# Patient Record
Sex: FEMALE | Race: WHITE | NOT HISPANIC OR LATINO
[De-identification: names, ages, dates, MRNs, and addresses within clinical notes are randomized per-mention and may not be internally consistent; named-entity substitution may affect disease eponyms.]

---

## 2017-01-13 ENCOUNTER — APPOINTMENT (OUTPATIENT)
Age: 67
End: 2017-01-13

## 2017-01-13 VITALS
HEIGHT: 61 IN | DIASTOLIC BLOOD PRESSURE: 87 MMHG | WEIGHT: 143 LBS | HEART RATE: 81 BPM | SYSTOLIC BLOOD PRESSURE: 156 MMHG | OXYGEN SATURATION: 98 % | BODY MASS INDEX: 27 KG/M2

## 2017-01-27 ENCOUNTER — OUTPATIENT (OUTPATIENT)
Dept: OUTPATIENT SERVICES | Facility: HOSPITAL | Age: 67
LOS: 1 days | Discharge: HOME | End: 2017-01-27
Payer: MEDICARE

## 2017-01-27 DIAGNOSIS — M54.17 RADICULOPATHY, LUMBOSACRAL REGION: ICD-10-CM

## 2017-02-24 ENCOUNTER — TRANSCRIPTION ENCOUNTER (OUTPATIENT)
Age: 67
End: 2017-02-24

## 2017-02-24 ENCOUNTER — OUTPATIENT (OUTPATIENT)
Dept: OUTPATIENT SERVICES | Facility: HOSPITAL | Age: 67
LOS: 1 days | End: 2017-02-24
Payer: MEDICARE

## 2017-02-24 DIAGNOSIS — M54.16 RADICULOPATHY, LUMBAR REGION: ICD-10-CM

## 2017-02-24 PROCEDURE — 76000 FLUOROSCOPY <1 HR PHYS/QHP: CPT

## 2017-02-24 PROCEDURE — 64493 INJ PARAVERT F JNT L/S 1 LEV: CPT | Mod: 50

## 2017-02-24 PROCEDURE — 64494 INJ PARAVERT F JNT L/S 2 LEV: CPT | Mod: 50

## 2017-03-07 ENCOUNTER — TRANSCRIPTION ENCOUNTER (OUTPATIENT)
Age: 67
End: 2017-03-07

## 2017-03-10 ENCOUNTER — APPOINTMENT (OUTPATIENT)
Age: 67
End: 2017-03-10

## 2017-04-13 PROCEDURE — 64483 NJX AA&/STRD TFRM EPI L/S 1: CPT | Mod: LT

## 2017-04-13 PROCEDURE — 76000 FLUOROSCOPY <1 HR PHYS/QHP: CPT

## 2017-04-13 PROCEDURE — 64484 NJX AA&/STRD TFRM EPI L/S EA: CPT | Mod: LT

## 2017-07-14 ENCOUNTER — APPOINTMENT (OUTPATIENT)
Age: 67
End: 2017-07-14

## 2017-07-14 VITALS
HEART RATE: 80 BPM | BODY MASS INDEX: 26.43 KG/M2 | OXYGEN SATURATION: 94 % | SYSTOLIC BLOOD PRESSURE: 145 MMHG | DIASTOLIC BLOOD PRESSURE: 76 MMHG | HEIGHT: 61 IN | WEIGHT: 140 LBS

## 2017-07-14 DIAGNOSIS — Z87.39 PERSONAL HISTORY OF OTHER DISEASES OF THE MUSCULOSKELETAL SYSTEM AND CONNECTIVE TISSUE: ICD-10-CM

## 2017-07-14 RX ORDER — LIDOCAINE HCL AND HYDROCORTISONE ACETATE 20; 20 MG/G; MG/G
CREAM RECTAL
Refills: 0 | Status: DISCONTINUED | COMMUNITY
End: 2017-07-14

## 2018-01-16 ENCOUNTER — OUTPATIENT (OUTPATIENT)
Dept: OUTPATIENT SERVICES | Facility: HOSPITAL | Age: 68
LOS: 1 days | End: 2018-01-16
Payer: MEDICARE

## 2018-01-16 ENCOUNTER — TRANSCRIPTION ENCOUNTER (OUTPATIENT)
Age: 68
End: 2018-01-16

## 2018-01-16 DIAGNOSIS — M46.1 SACROILIITIS, NOT ELSEWHERE CLASSIFIED: ICD-10-CM

## 2018-01-16 PROCEDURE — G0260: CPT | Mod: 50

## 2018-01-16 PROCEDURE — 76000 FLUOROSCOPY <1 HR PHYS/QHP: CPT

## 2018-03-09 ENCOUNTER — APPOINTMENT (OUTPATIENT)
Age: 68
End: 2018-03-09
Payer: MEDICARE

## 2018-03-09 VITALS
SYSTOLIC BLOOD PRESSURE: 130 MMHG | BODY MASS INDEX: 26.06 KG/M2 | RESPIRATION RATE: 14 BRPM | DIASTOLIC BLOOD PRESSURE: 86 MMHG | TEMPERATURE: 98.1 F | HEART RATE: 88 BPM | HEIGHT: 61 IN | OXYGEN SATURATION: 87 % | WEIGHT: 138 LBS

## 2018-03-09 PROCEDURE — 99214 OFFICE O/P EST MOD 30 MIN: CPT

## 2018-03-09 RX ORDER — LIFITEGRAST 50 MG/ML
5 SOLUTION/ DROPS OPHTHALMIC
Refills: 0 | Status: DISCONTINUED | COMMUNITY
End: 2018-03-09

## 2018-03-09 RX ORDER — ACETAMINOPHEN 325 MG
5000 TABLET ORAL
Refills: 0 | Status: DISCONTINUED | COMMUNITY
End: 2018-03-09

## 2018-08-10 ENCOUNTER — APPOINTMENT (OUTPATIENT)
Dept: HEPATOLOGY | Facility: CLINIC | Age: 68
End: 2018-08-10
Payer: MEDICARE

## 2018-08-10 VITALS
DIASTOLIC BLOOD PRESSURE: 76 MMHG | HEIGHT: 60.5 IN | OXYGEN SATURATION: 97 % | BODY MASS INDEX: 25.82 KG/M2 | TEMPERATURE: 98.6 F | HEART RATE: 75 BPM | WEIGHT: 135 LBS | SYSTOLIC BLOOD PRESSURE: 127 MMHG

## 2018-08-10 DIAGNOSIS — Z86.39 PERSONAL HISTORY OF OTHER ENDOCRINE, NUTRITIONAL AND METABOLIC DISEASE: ICD-10-CM

## 2018-08-10 PROCEDURE — 99214 OFFICE O/P EST MOD 30 MIN: CPT

## 2019-01-25 ENCOUNTER — APPOINTMENT (OUTPATIENT)
Dept: HEPATOLOGY | Facility: CLINIC | Age: 69
End: 2019-01-25
Payer: MEDICARE

## 2019-01-25 VITALS
SYSTOLIC BLOOD PRESSURE: 161 MMHG | OXYGEN SATURATION: 95 % | WEIGHT: 128 LBS | DIASTOLIC BLOOD PRESSURE: 93 MMHG | HEIGHT: 60.5 IN | HEART RATE: 87 BPM | BODY MASS INDEX: 24.48 KG/M2

## 2019-01-25 PROCEDURE — 99214 OFFICE O/P EST MOD 30 MIN: CPT

## 2019-01-25 NOTE — REVIEW OF SYSTEMS
[Recent Weight Loss (___ Lbs)] : recent [unfilled] ~Ulb weight loss [Dry Eyes] : dryness of the eyes [Heartburn] : heartburn [Negative] : Heme/Lymph [Fever] : no fever [Chills] : no chills [Feeling Poorly] : not feeling poorly [Feeling Tired] : not feeling tired [Recent Weight Gain (___ Lbs)] : no recent weight gain [FreeTextEntry9] : back pain

## 2019-01-25 NOTE — ASSESSMENT
[FreeTextEntry1] : This patient with PBC has normal alkaline phosphatase and is well-controlled on ursodiol which she will continue.  The patient does have dryness of her eyes and has had Lasik surgery and will follow up with her ophthalmologist to assure health of her corneas, the patient has had bone density.  Assessment showing osteopenia and participates in weight-bearing exercise, and reports vitamin D levels have been normal.  Hyperlipidemia is well-controlled on statin therapy, which shows no evidence of hepatotoxicity.  The patient will continue ursodiol with a return visit in 6 months.

## 2019-01-25 NOTE — HISTORY OF PRESENT ILLNESS
[Autoimmune Disorder] : autoimmune disorder [Moderate] : moderate in severity [Unchanged] : unchanged [Fatigue] : denies fatigue [Malaise] : denies malaise [Fever] : denies fever [Diffuse Joint Pain (Arthralgias)] : arthralgias stable [Muscle Aches, Generalized (Myalgias)] : denies myalgias [Yellow Skin Or Eyes (Jaundice)] : denies jaundice [Abdominal Pain] : denies abdominal pain [Urine Tests Nonspecific Abnormal Findings Biliuria] : denies dark urine [Light Colored Bowel Movement (Acholic Stools)] : denies pale stools [Insomnia] : denies insomnia [Skin: Rash] : denies rash [Itching (Pruritus)] : denies pruritus [Shortness Of Breath] : denies shortness of breath [Wt Loss ___ Lbs] : recent [unfilled] ~Upound(s) weight loss [Needlestick Exposure] : no needlestick exposure [Infected Sexual Partner] : no infected sexual partner [IV Drug Use] : no IV drug use [Tattoo] : no tattoos [Body Piercing] : no body piercing [Hemodialysis] : no hemodialysis [Transfusion before 1992] : no transfusion before 1992 [Transplant before 1992] : no transplant before 1992 [Incarceration] : no incarceration [Alcohol Abuse] : no alcohol abuse [Household Contact to HBV] : no household contact to HBV [Travel to Endemic Area] : no travel to an endemic area [Occupational Exposure] : no occupational exposure [Wt Gain ___ Lbs] : no recent weight gain [de-identified] : This patient has had a liver biopsy in 2016, consistent with primary biliary cholangitis.  The patient is a positive mitochondrial antibody with elevated IgM.  The patient has been on ursodiol and has normalized.  Alkaline phosphatase values.  She does complain of dryness of her eyes and some dryness of her mouth.  She has no significant itching and no depression nor fatigue.  The patient is compliant with her medication taking ursodiol 750 mg daily.\par \par The patient has had complaints of heartburn and has had an upper endoscopy and reports no evidence of portal hypertensive gastropathy or varices.  The patient's lipids are controlled with statin therapy

## 2019-01-25 NOTE — PHYSICAL EXAM
[Scleral Icterus] : No Scleral Icterus [Hepatojugular Reflux] : patient did not have a sustained hepatojugular reflux [Spider Angioma] : No spider angioma(s) were observed [Abdominal Bruit] : no abdominal bruit [Abdominal  Ascites] : no ascites [Ascites Fluid Wave] : no ascites fluid wave [Ascites Tense] : ascites is not tense [Ascites Shifting Dullness] : no shifting dullness of ascites [Splenomegaly] : no splenomegaly [Caput Medusae] : no caput medusae observed [Liver Size (___ Cm)] : Liver size [unfilled] cm [Liver Palpable ___ Finger Breadths Below Costal Margin] : Liver edge was palpable [unfilled] finger breadths below costal margin [Non-Tender] : non-tender [Smooth] : smooth [Asterixis] : no asterixis observed [Jaundice] : No jaundice [Palmar Erythema] : no Palmar Erythema [General Appearance - Alert] : alert [General Appearance - In No Acute Distress] : in no acute distress [General Appearance - Well Nourished] : well nourished [General Appearance - Well Developed] : well developed [General Appearance - Well-Appearing] : healthy appearing [Sclera] : the sclera and conjunctiva were normal [Outer Ear] : the ears and nose were normal in appearance [Examination Of The Oral Cavity] : the lips and gums were normal [Neck Appearance] : the appearance of the neck was normal [Neck Cervical Mass (___cm)] : no neck mass was observed [Jugular Venous Distention Increased] : there was no jugular-venous distention [Thyroid Diffuse Enlargement] : the thyroid was not enlarged [Respiration, Rhythm And Depth] : normal respiratory rhythm and effort [Exaggerated Use Of Accessory Muscles For Inspiration] : no accessory muscle use [Auscultation Breath Sounds / Voice Sounds] : lungs were clear to auscultation bilaterally [Heart Rate And Rhythm] : heart rate was normal and rhythm regular [Heart Sounds] : normal S1 and S2 [Murmurs] : no murmurs [Edema] : there was no peripheral edema [Bowel Sounds] : normal bowel sounds [Abdomen Soft] : soft [Abdomen Tenderness] : non-tender [] : no hepato-splenomegaly [Abdomen Mass (___ Cm)] : no abdominal mass palpated [Supraclavicular Lymph Nodes Enlarged Bilaterally] : supraclavicular [No CVA Tenderness] : no ~M costovertebral angle tenderness [Nail Clubbing] : no clubbing  or cyanosis of the fingernails [Involuntary Movements] : no involuntary movements were seen [Skin Color & Pigmentation] : normal skin color and pigmentation [Skin Turgor] : normal skin turgor [FreeTextEntry1] : eczema on hands [No Focal Deficits] : no focal deficits [Oriented To Time, Place, And Person] : oriented to person, place, and time

## 2019-06-11 ENCOUNTER — TRANSCRIPTION ENCOUNTER (OUTPATIENT)
Age: 69
End: 2019-06-11

## 2019-07-26 ENCOUNTER — APPOINTMENT (OUTPATIENT)
Dept: HEPATOLOGY | Facility: CLINIC | Age: 69
End: 2019-07-26

## 2019-11-06 ENCOUNTER — APPOINTMENT (OUTPATIENT)
Dept: HEPATOLOGY | Facility: CLINIC | Age: 69
End: 2019-11-06
Payer: MEDICARE

## 2019-11-06 VITALS
HEART RATE: 82 BPM | BODY MASS INDEX: 24.87 KG/M2 | OXYGEN SATURATION: 95 % | HEIGHT: 60.5 IN | DIASTOLIC BLOOD PRESSURE: 78 MMHG | SYSTOLIC BLOOD PRESSURE: 154 MMHG | WEIGHT: 130 LBS

## 2019-11-06 PROCEDURE — 99214 OFFICE O/P EST MOD 30 MIN: CPT

## 2019-11-06 RX ORDER — CYCLOBENZAPRINE HYDROCHLORIDE 5 MG/1
5 TABLET, FILM COATED ORAL
Qty: 60 | Refills: 0 | Status: DISCONTINUED | COMMUNITY
Start: 2017-12-13 | End: 2019-11-06

## 2020-05-05 RX ORDER — ZOLPIDEM TARTRATE 5 MG/1
TABLET, FILM COATED ORAL
Refills: 0 | Status: DISCONTINUED | COMMUNITY
End: 2020-05-05

## 2020-05-06 ENCOUNTER — APPOINTMENT (OUTPATIENT)
Dept: HEPATOLOGY | Facility: CLINIC | Age: 70
End: 2020-05-06
Payer: MEDICARE

## 2020-05-06 DIAGNOSIS — M35.01 SICCA SYNDROME WITH KERATOCONJUNCTIVITIS: ICD-10-CM

## 2020-05-06 PROCEDURE — 99214 OFFICE O/P EST MOD 30 MIN: CPT | Mod: 95

## 2020-05-06 NOTE — PHYSICAL EXAM
[General Appearance - Alert] : alert [General Appearance - In No Acute Distress] : in no acute distress [General Appearance - Well Nourished] : well nourished [General Appearance - Well Developed] : well developed [General Appearance - Well-Appearing] : healthy appearing [] : no respiratory distress [Respiration, Rhythm And Depth] : normal respiratory rhythm and effort [Oriented To Time, Place, And Person] : oriented to person, place, and time

## 2020-05-06 NOTE — REVIEW OF SYSTEMS
[Feeling Tired] : feeling tired [Recent Weight Gain (___ Lbs)] : recent [unfilled] ~Ulb weight gain [Dry Eyes] : dryness of the eyes [Negative] : Heme/Lymph [FreeTextEntry4] : sinus congestion

## 2020-05-06 NOTE — REVIEW OF SYSTEMS
[Recent Weight Loss (___ Lbs)] : recent [unfilled] ~Ulb weight loss [Dry Eyes] : dryness of the eyes [Heartburn] : heartburn [Depression] : depression [Negative] : Heme/Lymph [Fever] : no fever [Chills] : no chills [Feeling Poorly] : not feeling poorly [Feeling Tired] : not feeling tired [Recent Weight Gain (___ Lbs)] : no recent weight gain [FreeTextEntry9] : back pain

## 2020-05-06 NOTE — HISTORY OF PRESENT ILLNESS
[Autoimmune Disorder] : autoimmune disorder [Moderate] : moderate in severity [Unchanged] : unchanged [Fatigue] : denies fatigue [Malaise] : denies malaise [Fever] : denies fever [Diffuse Joint Pain (Arthralgias)] : denies arthralgias [Muscle Aches, Generalized (Myalgias)] : denies myalgias [Yellow Skin Or Eyes (Jaundice)] : denies jaundice [Abdominal Pain] : denies abdominal pain [Urine Tests Nonspecific Abnormal Findings Biliuria] : denies dark urine [Light Colored Bowel Movement (Acholic Stools)] : denies pale stools [Insomnia] : insomnia worsened [Skin: Rash] : denies rash [Itching (Pruritus)] : denies pruritus [Shortness Of Breath] : denies shortness of breath [Needlestick Exposure] : no needlestick exposure [Infected Sexual Partner] : no infected sexual partner [IV Drug Use] : no IV drug use [Tattoo] : no tattoos [Body Piercing] : no body piercing [Hemodialysis] : no hemodialysis [Transfusion before 1992] : no transfusion before 1992 [Transplant before 1992] : no transplant before 1992 [Incarceration] : no incarceration [Alcohol Abuse] : no alcohol abuse [Household Contact to HBV] : no household contact to HBV [Travel to Endemic Area] : no travel to an endemic area [Occupational Exposure] : no occupational exposure [Cocaine Use] : no cocaine use [Wt Gain ___ Lbs] : no recent weight gain [Wt Loss ___ Lbs] : no recent weight loss [de-identified] : This patient has had a liver biopsy in 2016, consistent with primary biliary cholangitis with positive mitochondrial antibody.  An elevated IgM.  Alkaline phosphatase values had normalized on ursodiol.  She does have dryness of her eyes but no pruritus nor fatigue.  The patient has suffered the death of her sister recently and does have depression.\par \par The patient has had recent laboratory testing, which has shown minimal elevation of ALT value, which has been present in the past.  Alkaline phosphatase values remain normal.\par \par The patient has had bone density.  Assessment April 2018 showing osteopenia.  This has been present for many years.

## 2020-05-06 NOTE — ASSESSMENT
[FreeTextEntry1] : This patient, primary biliary cholangitis, as normal.  Alkaline phosphatase levels on ursodiol.  Patient will remain on ursodiol without change in dose.  He will have laboratory tests monitored and ALT value may be associated with her hyperlipidemia or possibly her statin therapy however, because of the low level of the abnormality in stable values.  I would not change therapy.  I plan to see the patient back in the office in 6 months.

## 2020-05-06 NOTE — PHYSICAL EXAM
[Liver Size (___ Cm)] : Liver size [unfilled] cm [Liver Palpable ___ Finger Breadths Below Costal Margin] : Liver edge was palpable [unfilled] finger breadths below costal margin [Non-Tender] : non-tender [Smooth] : smooth [General Appearance - Alert] : alert [General Appearance - In No Acute Distress] : in no acute distress [General Appearance - Well Nourished] : well nourished [General Appearance - Well Developed] : well developed [General Appearance - Well-Appearing] : healthy appearing [Sclera] : the sclera and conjunctiva were normal [Outer Ear] : the ears and nose were normal in appearance [Examination Of The Oral Cavity] : the lips and gums were normal [Neck Appearance] : the appearance of the neck was normal [Neck Cervical Mass (___cm)] : no neck mass was observed [Jugular Venous Distention Increased] : there was no jugular-venous distention [Thyroid Diffuse Enlargement] : the thyroid was not enlarged [Respiration, Rhythm And Depth] : normal respiratory rhythm and effort [Exaggerated Use Of Accessory Muscles For Inspiration] : no accessory muscle use [Auscultation Breath Sounds / Voice Sounds] : lungs were clear to auscultation bilaterally [Heart Rate And Rhythm] : heart rate was normal and rhythm regular [Heart Sounds] : normal S1 and S2 [Murmurs] : no murmurs [Edema] : there was no peripheral edema [Bowel Sounds] : normal bowel sounds [Abdomen Soft] : soft [Abdomen Tenderness] : non-tender [] : no hepato-splenomegaly [Abdomen Mass (___ Cm)] : no abdominal mass palpated [Supraclavicular Lymph Nodes Enlarged Bilaterally] : supraclavicular [No CVA Tenderness] : no ~M costovertebral angle tenderness [Nail Clubbing] : no clubbing  or cyanosis of the fingernails [Involuntary Movements] : no involuntary movements were seen [Skin Color & Pigmentation] : normal skin color and pigmentation [Skin Turgor] : normal skin turgor [No Focal Deficits] : no focal deficits [Oriented To Time, Place, And Person] : oriented to person, place, and time [Scleral Icterus] : No Scleral Icterus [Hepatojugular Reflux] : patient did not have a sustained hepatojugular reflux [Spider Angioma] : No spider angioma(s) were observed [Abdominal Bruit] : no abdominal bruit [Abdominal  Ascites] : no ascites [Ascites Fluid Wave] : no ascites fluid wave [Ascites Tense] : ascites is not tense [Ascites Shifting Dullness] : no shifting dullness of ascites [Splenomegaly] : no splenomegaly [Caput Medusae] : no caput medusae observed [Asterixis] : no asterixis observed [Jaundice] : No jaundice [Palmar Erythema] : no Palmar Erythema [FreeTextEntry1] : eczema on hands

## 2020-05-06 NOTE — HISTORY OF PRESENT ILLNESS
[Home] : at home, [unfilled] , at the time of the visit. [Other Location: e.g. Home (Enter Location, City,State)___] : at [unfilled] [Patient] : the patient [Self] : self [Autoimmune Disorder] : autoimmune disorder [Fatigue] : fatigue worsened [Malaise] : denies malaise [Fever] : denies fever [Diffuse Joint Pain (Arthralgias)] : denies arthralgias [Muscle Aches, Generalized (Myalgias)] : denies myalgias [Yellow Skin Or Eyes (Jaundice)] : denies jaundice [Abdominal Pain] : denies abdominal pain [Urine Tests Nonspecific Abnormal Findings Biliuria] : denies dark urine [Light Colored Bowel Movement (Acholic Stools)] : denies pale stools [Insomnia] : denies insomnia [Skin: Rash] : denies rash [Itching (Pruritus)] : denies pruritus [Shortness Of Breath] : denies shortness of breath [Wt Gain ___ Lbs] : recent [unfilled] ~Upound(s) weight gain [de-identified] : This patient has history of primary biliary cholangitis with a liver biopsy confirming the diagnosis in 2016.  AMA was positive and IgM was elevated.  The patient had normalized.  Alkaline phosphatase on ursodiol at a dose of 750 mg per day.\par \par Most recently, the patient has had fluctuating weight with a current weight of 134 pounds.  She complains of dryness of her eyes congestion of her sinuses and fatigue which has been more prominent recently.  Thyroid tests have been normal.  In February of 2020.  Patient also complains of itching, but largely limited to, her feet.  \par \par The patient is compliant with her Colleen dosing.  Hemoglobin A1c has been normal.  The patient has been told of osteopenia in the past and does use vitamin D on occasion and will have bone density.  Assessment done in the near future.

## 2020-05-06 NOTE — ASSESSMENT
[FreeTextEntry1] : This patient has mild elevation of liver tests obtained April 29, 2028 with bilirubin 1.1, alkaline phosphatase 125, AST 64, .  The patient does complain of increased fatigue for thyroid testing has been normal.  I will increase ursodiol to 1000 mg per day, which will be at a dose of 15 mg per kilogram given her somewhat increased weight.  The patient will have repeat laboratory testing in 3 months with a return visit.  I have suggested she seek bone density.  Assessment with her primary physician.  Continue to use artificial tears and maintain good dental hygiene.

## 2020-08-26 ENCOUNTER — LABORATORY RESULT (OUTPATIENT)
Age: 70
End: 2020-08-26

## 2020-08-26 ENCOUNTER — APPOINTMENT (OUTPATIENT)
Dept: HEPATOLOGY | Facility: CLINIC | Age: 70
End: 2020-08-26
Payer: MEDICARE

## 2020-08-26 VITALS
SYSTOLIC BLOOD PRESSURE: 152 MMHG | BODY MASS INDEX: 25.72 KG/M2 | WEIGHT: 131 LBS | DIASTOLIC BLOOD PRESSURE: 78 MMHG | HEART RATE: 81 BPM | HEIGHT: 60 IN | OXYGEN SATURATION: 96 %

## 2020-08-26 PROCEDURE — 99214 OFFICE O/P EST MOD 30 MIN: CPT | Mod: 25

## 2020-08-26 PROCEDURE — 36415 COLL VENOUS BLD VENIPUNCTURE: CPT

## 2020-08-26 NOTE — CONSULT LETTER
[Dear  ___] : Dear  [unfilled], [Courtesy Letter:] : I had the pleasure of seeing your patient, [unfilled], in my office today. [Please see my note below.] : Please see my note below. [Sincerely,] : Sincerely, [FreeTextEntry3] : Shabbir Genao Jr., M.D.\par [facility]\par [address]\[place] [address]\par [phone]\par

## 2020-08-26 NOTE — PHYSICAL EXAM
[Hepatojugular Reflux] : patient did not have a sustained hepatojugular reflux [Scleral Icterus] : No Scleral Icterus [Abdominal Bruit] : no abdominal bruit [Spider Angioma] : No spider angioma(s) were observed [Ascites Tense] : ascites is not tense [Abdominal  Ascites] : no ascites [Ascites Fluid Wave] : no ascites fluid wave [Ascites Shifting Dullness] : no shifting dullness of ascites [Splenomegaly] : no splenomegaly [Liver Size (___ Cm)] : Liver size [unfilled] cm [Caput Medusae] : no caput medusae observed [Smooth] : smooth [Non-Tender] : non-tender [Liver Palpable ___ Finger Breadths Below Costal Margin] : Liver edge was palpable [unfilled] finger breadths below costal margin [Jaundice] : No jaundice [Asterixis] : no asterixis observed [Palmar Erythema] : no Palmar Erythema [General Appearance - Alert] : alert [General Appearance - Well Developed] : well developed [General Appearance - In No Acute Distress] : in no acute distress [General Appearance - Well Nourished] : well nourished [General Appearance - Well-Appearing] : healthy appearing [Outer Ear] : the ears and nose were normal in appearance [Sclera] : the sclera and conjunctiva were normal [Neck Appearance] : the appearance of the neck was normal [Examination Of The Oral Cavity] : the lips and gums were normal [Jugular Venous Distention Increased] : there was no jugular-venous distention [Neck Cervical Mass (___cm)] : no neck mass was observed [Thyroid Diffuse Enlargement] : the thyroid was not enlarged [Exaggerated Use Of Accessory Muscles For Inspiration] : no accessory muscle use [Respiration, Rhythm And Depth] : normal respiratory rhythm and effort [Heart Sounds] : normal S1 and S2 [Heart Rate And Rhythm] : heart rate was normal and rhythm regular [Auscultation Breath Sounds / Voice Sounds] : lungs were clear to auscultation bilaterally [Edema] : there was no peripheral edema [Murmurs] : no murmurs [Bowel Sounds] : normal bowel sounds [Abdomen Soft] : soft [Abdomen Tenderness] : non-tender [] : no hepato-splenomegaly [Abdomen Mass (___ Cm)] : no abdominal mass palpated [Supraclavicular Lymph Nodes Enlarged Bilaterally] : supraclavicular [No CVA Tenderness] : no ~M costovertebral angle tenderness [Nail Clubbing] : no clubbing  or cyanosis of the fingernails [Involuntary Movements] : no involuntary movements were seen [Skin Turgor] : normal skin turgor [Skin Color & Pigmentation] : normal skin color and pigmentation [FreeTextEntry1] : eczema on hands [No Focal Deficits] : no focal deficits [Oriented To Time, Place, And Person] : oriented to person, place, and time

## 2020-08-26 NOTE — ASSESSMENT
[FreeTextEntry1] : This patient with persisting elevation of aminotransferase use will have laboratory testing looking at immune mediated hepatitis.  Laboratory testing.  I will also obtain MR Elastography for a reliable assessment of liver fibrosis.  The patient will increase her vitamin D supplementation.  2 2000 mg each morning and will have a repeat on density.  Assessment.  I will see the patient in 3 months to decide if further therapy including immune suppressive therapy would be helpful for any autoimmune hepatitis component of her disease.

## 2020-08-26 NOTE — REVIEW OF SYSTEMS
[Fever] : no fever [Chills] : no chills [Feeling Tired] : feeling tired [Feeling Poorly] : not feeling poorly [Recent Weight Loss (___ Lbs)] : recent [unfilled] ~Ulb weight loss [Recent Weight Gain (___ Lbs)] : no recent weight gain [Diarrhea] : diarrhea [Heartburn] : heartburn [Dry Eyes] : dryness of the eyes [Depression] : depression [Negative] : Heme/Lymph [FreeTextEntry9] : back pain

## 2020-08-26 NOTE — HISTORY OF PRESENT ILLNESS
[Infected Sexual Partner] : no infected sexual partner [Needlestick Exposure] : no needlestick exposure [IV Drug Use] : no IV drug use [Body Piercing] : no body piercing [Tattoo] : no tattoos [Incarceration] : no incarceration [Transfusion before 1992] : no transfusion before 1992 [Transplant before 1992] : no transplant before 1992 [Alcohol Abuse] : no alcohol abuse [Autoimmune Disorder] : autoimmune disorder [Household Contact to HBV] : no household contact to HBV [Cocaine Use] : no cocaine use [Occupational Exposure] : no occupational exposure [Travel to Endemic Area] : no travel to an endemic area [Unchanged] : unchanged [Severe] : severe [Malaise] : denies malaise [Diffuse Joint Pain (Arthralgias)] : denies arthralgias [Fever] : denies fever [Fatigue] : fatigue worsened [Yellow Skin Or Eyes (Jaundice)] : denies jaundice [Muscle Aches, Generalized (Myalgias)] : denies myalgias [Urine Tests Nonspecific Abnormal Findings Biliuria] : denies dark urine [Abdominal Pain] : denies abdominal pain [Light Colored Bowel Movement (Acholic Stools)] : denies pale stools [Insomnia] : denies insomnia [Itching (Pruritus)] : denies pruritus [Skin: Rash] : denies rash [Shortness Of Breath] : denies shortness of breath [Wt Loss ___ Lbs] : no recent weight loss [Wt Gain ___ Lbs] : no recent weight gain [de-identified] : This patient has had a liver biopsy showing were liver disease, most consistent with primary biliary cholangitis, however, interface, hepatitis, and the presence of plasma cells raise the possibility of a component of autoimmune hepatitis.  Mitochondrial antibody was positive and I IgM levels were elevated .The patient has been treated with ursodiol.  Alkaline phosphatase levels have normalized.  The patient has persisting abnormality of aminotransferase values with ALT values of about 100 and AST values of about 60.  Immunoglobulin G levels have been near normal at 1200.  The patient does complain of fatigue.  The patient had increased ursodiol dose from 750-1000 mg daily, but there was no significant improvement on aminotransferase values.\par \par The patient has been told of osteopenia in the past.  Laboratory tests show low levels of vitamin B and the patient is currently on vitamin D supplements 1000 units per day.\par \par The patient has had upper endoscopy about 2 years ago and is not aware of any evidence of esophageal varices.  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

## 2020-08-27 LAB
ANA PAT FLD IF-IMP: ABNORMAL
ANA SER IF-ACNC: ABNORMAL
ANCA AB SER-IMP: NEGATIVE
C-ANCA SER-ACNC: NEGATIVE
DEPRECATED KAPPA LC FREE/LAMBDA SER: 1.11 RATIO
IGA SER QL IEP: 188 MG/DL
IGG SER QL IEP: 737 MG/DL
IGM SER QL IEP: 130 MG/DL
KAPPA LC CSF-MCNC: 1.21 MG/DL
KAPPA LC SERPL-MCNC: 1.34 MG/DL
MITOCHONDRIA AB SER IF-ACNC: NORMAL
O2 CT VFR BLD CALC: ABNORMAL
P-ANCA TITR SER IF: POSITIVE
SMOOTH MUSCLE AB SER QL IF: NORMAL
THYROGLOB AB SERPL-ACNC: <20 IU/ML
THYROPEROXIDASE AB SERPL IA-ACNC: <10 IU/ML

## 2020-08-28 LAB — LKM AB SER QL IF: <20.1 UNITS

## 2020-09-02 LAB
CELIAC DISEASE INTERPRETATION: NORMAL
CELIAC GENE PAIRS PRESENT: YES
DQ ALPHA 1: NORMAL
DQ BETA 1: NORMAL
IGG4 SER-MCNC: 24.3 MG/DL
IMMUNOGLOBULIN A (IGA): 200 MG/DL

## 2020-09-04 ENCOUNTER — RESULT REVIEW (OUTPATIENT)
Age: 70
End: 2020-09-04

## 2020-09-04 ENCOUNTER — OUTPATIENT (OUTPATIENT)
Dept: OUTPATIENT SERVICES | Facility: HOSPITAL | Age: 70
LOS: 1 days | End: 2020-09-04
Payer: MEDICARE

## 2020-09-04 ENCOUNTER — APPOINTMENT (OUTPATIENT)
Dept: MRI IMAGING | Facility: CLINIC | Age: 70
End: 2020-09-04
Payer: MEDICARE

## 2020-09-04 DIAGNOSIS — K74.3 PRIMARY BILIARY CIRRHOSIS: ICD-10-CM

## 2020-09-04 PROCEDURE — 74183 MRI ABD W/O CNTR FLWD CNTR: CPT

## 2020-09-04 PROCEDURE — A9585: CPT

## 2020-09-04 PROCEDURE — 76391 MR ELASTOGRAPHY: CPT

## 2020-09-04 PROCEDURE — 76391 MR ELASTOGRAPHY: CPT | Mod: 26

## 2020-09-04 PROCEDURE — 74183 MRI ABD W/O CNTR FLWD CNTR: CPT | Mod: 26

## 2020-09-09 DIAGNOSIS — K31.89 OTHER DISEASES OF STOMACH AND DUODENUM: ICD-10-CM

## 2020-09-11 ENCOUNTER — RESULT REVIEW (OUTPATIENT)
Age: 70
End: 2020-09-11

## 2020-09-11 ENCOUNTER — APPOINTMENT (OUTPATIENT)
Dept: CT IMAGING | Facility: CLINIC | Age: 70
End: 2020-09-11
Payer: MEDICARE

## 2020-09-11 ENCOUNTER — OUTPATIENT (OUTPATIENT)
Dept: OUTPATIENT SERVICES | Facility: HOSPITAL | Age: 70
LOS: 1 days | End: 2020-09-11
Payer: MEDICARE

## 2020-09-11 DIAGNOSIS — Z00.00 ENCOUNTER FOR GENERAL ADULT MEDICAL EXAMINATION WITHOUT ABNORMAL FINDINGS: ICD-10-CM

## 2020-09-11 DIAGNOSIS — K31.89 OTHER DISEASES OF STOMACH AND DUODENUM: ICD-10-CM

## 2020-09-11 PROCEDURE — 71260 CT THORAX DX C+: CPT

## 2020-09-11 PROCEDURE — 71260 CT THORAX DX C+: CPT | Mod: 26

## 2020-09-11 PROCEDURE — 74177 CT ABD & PELVIS W/CONTRAST: CPT | Mod: 26

## 2020-09-11 PROCEDURE — 74177 CT ABD & PELVIS W/CONTRAST: CPT

## 2020-10-06 ENCOUNTER — APPOINTMENT (OUTPATIENT)
Dept: PULMONOLOGY | Facility: CLINIC | Age: 70
End: 2020-10-06
Payer: MEDICARE

## 2020-10-06 VITALS — HEART RATE: 80 BPM | DIASTOLIC BLOOD PRESSURE: 82 MMHG | SYSTOLIC BLOOD PRESSURE: 144 MMHG | OXYGEN SATURATION: 94 %

## 2020-10-06 VITALS — BODY MASS INDEX: 26.56 KG/M2 | WEIGHT: 136 LBS

## 2020-10-06 PROCEDURE — 99205 OFFICE O/P NEW HI 60 MIN: CPT | Mod: 25

## 2020-10-06 PROCEDURE — 99406 BEHAV CHNG SMOKING 3-10 MIN: CPT

## 2020-10-06 NOTE — HISTORY OF PRESENT ILLNESS
[TextBox_4] : sent for abnormal CT scan\par hx Primary biliary cholangitis , followed by Hepatology\par smoker x 40 yrs, still smoking\par mild exertional dyspnea chronic, no acute change\par Also followed by cardiology\par No fevers, chill, chest pain\par no purulent sputum\par getting PET scan per pt\par recent EUS with bx, for abdominal node\par no hx TB exposure\par

## 2020-10-06 NOTE — DISCUSSION/SUMMARY
[FreeTextEntry1] : Mild anatomic emphysema, Nicotine addiction, multiple lung nodules\par Pelvic mass ( not new ) and abdominal adenopathy (also noted on prior  scans) , post EUS -pathology pending\par CT scan nodules also seen on prior scans, Lingula lobulated density, seen 2018 ? size change on latest scan\par Lung exam is normal normal sp02\par Needs PFTs\par Cardiology follow up\par smoking cessation, nicotine replacement reviewed 4 min\par Pt will bring CT scan disc from [facility] for direct comparison of lung nodules\par Pet scan planned per GI, pelvic work up in progress\par 3 months or sooner if needed, will contact with above results\par \par

## 2020-10-06 NOTE — CONSULT LETTER
[Dear  ___] : Dear  [unfilled], [Consult Letter:] : I had the pleasure of evaluating your patient, [unfilled]. [Please see my note below.] : Please see my note below. [Sincerely,] : Sincerely, [FreeTextEntry3] : Wilmar Lam DO [facility]\par Pulmonary Critical Care\par Director Pulmonary Division\par Medical Director Respiratory Therapy\par [facility]\par \par  [DrAdi  ___] : Dr. VINES

## 2020-10-06 NOTE — PHYSICAL EXAM
[No Acute Distress] : no acute distress [Normal Oropharynx] : normal oropharynx [Normal Appearance] : normal appearance [Normal Rate/Rhythm] : normal rate/rhythm [Normal Pulses] : normal pulses [Normal S1, S2] : normal s1, s2 [No Murmurs] : no murmurs [No Resp Distress] : no resp distress [No Acc Muscle Use] : no acc muscle use [Normal Rhythm and Effort] : normal rhythm and effort [Clear to Auscultation Bilaterally] : clear to auscultation bilaterally [Normal Gait] : normal gait [No Clubbing] : no clubbing [No Cyanosis] : no cyanosis [No Edema] : no edema [No Focal Deficits] : no focal deficits [Oriented x3] : oriented x3 [Normal Affect] : normal affect

## 2020-11-06 ENCOUNTER — TRANSCRIPTION ENCOUNTER (OUTPATIENT)
Age: 70
End: 2020-11-06

## 2020-12-02 ENCOUNTER — APPOINTMENT (OUTPATIENT)
Dept: HEPATOLOGY | Facility: CLINIC | Age: 70
End: 2020-12-02
Payer: MEDICARE

## 2020-12-02 VITALS
HEART RATE: 82 BPM | DIASTOLIC BLOOD PRESSURE: 73 MMHG | BODY MASS INDEX: 25.91 KG/M2 | WEIGHT: 132 LBS | OXYGEN SATURATION: 98 % | SYSTOLIC BLOOD PRESSURE: 161 MMHG | HEIGHT: 60 IN

## 2020-12-02 PROCEDURE — 99214 OFFICE O/P EST MOD 30 MIN: CPT

## 2020-12-02 NOTE — HISTORY OF PRESENT ILLNESS
[Moderate] : moderate in severity [Unchanged] : unchanged [Fatigue] : fatigue stable [Malaise] : denies malaise [Fever] : denies fever [Diffuse Joint Pain (Arthralgias)] : arthralgias stable [Muscle Aches, Generalized (Myalgias)] : myalgias stable [Yellow Skin Or Eyes (Jaundice)] : denies jaundice [Abdominal Pain] : denies abdominal pain [Urine Tests Nonspecific Abnormal Findings Biliuria] : denies dark urine [Light Colored Bowel Movement (Acholic Stools)] : denies pale stools [Insomnia] : denies insomnia [Skin: Rash] : denies rash [Itching (Pruritus)] : denies pruritus [Shortness Of Breath] : denies shortness of breath [Wt Gain ___ Lbs] : no recent weight gain [Wt Loss ___ Lbs] : no recent weight loss [de-identified] : This patient underwent liver biopsy in April of 2015 showing granulomatous hepatitis.  The patient has had elevated mitochondrial antibody and IgM levels leading to a diagnosis of primary biliary cholangitis.  The patient has been on ursodiol currently at a dose of 750 mg daily.  Alkaline phosphatase levels have normalized.  The patient does have elevation of aminotransferase values typically less than 100.  The patient also complains of multiple muscle aches.  The patient is on atorvastatin and has had elevated blood lipids in the past.  Abdominal MRI showed Elastography showing stage II-3 fibrosis with no significant increase in liver fat\par \par The patient has had multiple lymph nodes identified in her lungs which are being followed and she has had an adnexal mass which has recently been removed through the surgery.  The patient has also been identified as having a kidney stone, which he is known for several years, but has not had associated pain..\par \par The patient has had an abdominal EUS, which was identified, fatty liver\par \par

## 2020-12-02 NOTE — REVIEW OF SYSTEMS
[Feeling Tired] : feeling tired [Dry Eyes] : dryness of the eyes [Diarrhea] : diarrhea [Heartburn] : heartburn [Negative] : Heme/Lymph [Fever] : no fever [Chills] : no chills [Feeling Poorly] : not feeling poorly [Recent Weight Gain (___ Lbs)] : no recent weight gain [Recent Weight Loss (___ Lbs)] : no recent weight loss [FreeTextEntry9] : back pain

## 2020-12-02 NOTE — ASSESSMENT
[FreeTextEntry1] : This patient with primary biliary cholangitis has normal  Alkaline phosphatase on an appropriate dose of ursodiol.  The patient also has elevated aminotransferase values and I will obtain CPK to look for a muscle origin of her enzyme elevation.  The patient has also been shown to have fatty liver on EUS and I will check her blood lipid profile.  The patient does not drink alcohol.\par \par I will see the patient back in the office in 6 months.

## 2020-12-04 ENCOUNTER — NON-APPOINTMENT (OUTPATIENT)
Age: 70
End: 2020-12-04

## 2020-12-04 LAB
ALBUMIN SERPL ELPH-MCNC: 4.4 G/DL
ALP BLD-CCNC: 110 U/L
ALT SERPL-CCNC: 86 U/L
ANION GAP SERPL CALC-SCNC: 10 MMOL/L
AST SERPL-CCNC: 50 U/L
BILIRUB SERPL-MCNC: 0.8 MG/DL
BUN SERPL-MCNC: 7 MG/DL
CALCIUM SERPL-MCNC: 9.6 MG/DL
CHLORIDE SERPL-SCNC: 105 MMOL/L
CHOLEST SERPL-MCNC: 167 MG/DL
CK SERPL-CCNC: 73 U/L
CO2 SERPL-SCNC: 28 MMOL/L
CREAT SERPL-MCNC: 0.71 MG/DL
GGT SERPL-CCNC: 24 U/L
GLUCOSE SERPL-MCNC: 96 MG/DL
HDLC SERPL-MCNC: 45 MG/DL
LDLC SERPL CALC-MCNC: 96 MG/DL
NONHDLC SERPL-MCNC: 121 MG/DL
POTASSIUM SERPL-SCNC: 5 MMOL/L
PROT SERPL-MCNC: 6.9 G/DL
SODIUM SERPL-SCNC: 143 MMOL/L
TRIGL SERPL-MCNC: 126 MG/DL

## 2021-02-16 ENCOUNTER — APPOINTMENT (OUTPATIENT)
Dept: CT IMAGING | Facility: CLINIC | Age: 71
End: 2021-02-16
Payer: MEDICARE

## 2021-02-16 ENCOUNTER — OUTPATIENT (OUTPATIENT)
Dept: OUTPATIENT SERVICES | Facility: HOSPITAL | Age: 71
LOS: 1 days | End: 2021-02-16

## 2021-02-16 DIAGNOSIS — K31.89 OTHER DISEASES OF STOMACH AND DUODENUM: ICD-10-CM

## 2021-02-16 PROCEDURE — 71250 CT THORAX DX C-: CPT | Mod: 26

## 2021-02-19 ENCOUNTER — NON-APPOINTMENT (OUTPATIENT)
Age: 71
End: 2021-02-19

## 2021-03-02 ENCOUNTER — APPOINTMENT (OUTPATIENT)
Dept: PULMONOLOGY | Facility: CLINIC | Age: 71
End: 2021-03-02
Payer: MEDICARE

## 2021-03-02 ENCOUNTER — NON-APPOINTMENT (OUTPATIENT)
Age: 71
End: 2021-03-02

## 2021-03-02 VITALS
TEMPERATURE: 98 F | WEIGHT: 135 LBS | HEART RATE: 74 BPM | BODY MASS INDEX: 26.5 KG/M2 | SYSTOLIC BLOOD PRESSURE: 130 MMHG | OXYGEN SATURATION: 98 % | DIASTOLIC BLOOD PRESSURE: 70 MMHG | HEIGHT: 60 IN

## 2021-03-02 DIAGNOSIS — F17.219 NICOTINE DEPENDENCE, CIGARETTES, WITH UNSPECIFIED NICOTINE-INDUCED DISORDERS: ICD-10-CM

## 2021-03-02 PROCEDURE — 99406 BEHAV CHNG SMOKING 3-10 MIN: CPT

## 2021-03-02 PROCEDURE — 99214 OFFICE O/P EST MOD 30 MIN: CPT | Mod: 25

## 2021-03-02 NOTE — DISCUSSION/SUMMARY
[FreeTextEntry1] : Mild anatomic emphysema, Nicotine addiction, multiple lung nodules\par PET scan without any significant uptake Lung, CT 2/21 no change MOSHE lobulated density from 2016\par Lung exam is normal normal sp02\par Needs PFTs, refuses currently\par Cardiology follow up\par smoking cessation, nicotine replacement reviewed 4 min\par discussed vaccinations\par 6 months or sooner if needed, will contact with above results\par \par

## 2021-03-02 NOTE — HISTORY OF PRESENT ILLNESS
[TextBox_4] : sent for abnormal CT scan\par hx Primary biliary cholangitis , followed by Hepatology\par smoker x 40 yrs, still smoking\par mild exertional dyspnea chronic, \par Also followed by cardiology\par No fevers, chill, chest pain\par no purulent sputum\par no hx TB exposure\par \par PET without any sig uptake 10/20 ,had R oophorectomy, benign by hx\par

## 2021-03-02 NOTE — CONSULT LETTER
[Dear  ___] : Dear  [unfilled], [Consult Letter:] : I had the pleasure of evaluating your patient, [unfilled]. [Please see my note below.] : Please see my note below. [Sincerely,] : Sincerely, [FreeTextEntry3] : Wilmar [facility] DO [facility]\par Pulmonary Critical Care\par Director Pulmonary Division\par Medical Director Respiratory Therapy\par [facility]\par \par  [DrAdi  ___] : Dr. VINES

## 2021-03-11 ENCOUNTER — APPOINTMENT (OUTPATIENT)
Dept: HEPATOLOGY | Facility: CLINIC | Age: 71
End: 2021-03-11
Payer: MEDICARE

## 2021-03-11 PROCEDURE — 99214 OFFICE O/P EST MOD 30 MIN: CPT | Mod: 95

## 2021-03-11 NOTE — HISTORY OF PRESENT ILLNESS
[Moderate] : moderate in severity [Unchanged] : unchanged [Fatigue] : fatigue stable [Malaise] : denies malaise [Fever] : denies fever [Diffuse Joint Pain (Arthralgias)] : arthralgias stable [Muscle Aches, Generalized (Myalgias)] : myalgias stable [Yellow Skin Or Eyes (Jaundice)] : denies jaundice [Abdominal Pain] : denies abdominal pain [Urine Tests Nonspecific Abnormal Findings Biliuria] : denies dark urine [Light Colored Bowel Movement (Acholic Stools)] : denies pale stools [Insomnia] : denies insomnia [Skin: Rash] : denies rash [Itching (Pruritus)] : denies pruritus [Shortness Of Breath] : denies shortness of breath [Wt Gain ___ Lbs] : no recent weight gain [Wt Loss ___ Lbs] : no recent weight loss [de-identified] : This visit was provided via telehealth using real time 2 way audio visual technology. The patient,AUDREY BALDERAS, was located at home, [address] [place] [address] , at the time of the visit. The provider, YOLANDA SHARPE, was located at Home, [place], [place] at the time of the visit. The patient, AUDREY BALDERAS and Provider participated in the Telehealth visit. Verbal consent for telehealth services was given on Mar 11, 2021 by the patient, AUDREY BALDERAS. \par \par Ms. AUDREY BALDERAS is 70 year old female who presents for the follow up appointment with Elevated Liver enzymes with PBC on Ursodiol.\par \par Pertinent H/O granulomatous hepatitis as per the liver biopsy in April of 2015. Elevated mitochondrial antibody and IgM levels leading to a diagnosis of primary biliary cholangitis.  The patient has been on ursodiol currently at a dose of 750 mg daily. Alkaline phosphatase levels have normalized.  She does have elevation of aminotransferase values typically less than 100. The patient is on atorvastatin for elevated blood lipids in the past.  \par \par Labs on 02/23/21 shows elevated AST/ALT/AP 76/123/122 with WDL - Tbil 1.0. Compared to WDL- ALP/Tbil and borderline elevations of AST/ALT 50/86.\par \par MRE on 09/4/20 shows Hepatic Fat Fraction: Normal. Iron Deposition: None. Stiffness: 3.5-4 kPa: Stage 2 to 3 fibrosis. A 9 mm right lower lobe pulmonary nodule. With multiple lymph nodes identified in her lungs which are being followed and she has an adnexal mass which was removed through the surgery.  Identified as having a kidney stone, which he is known for several years, but has not had associated pain.\par \par Abdominal EUS, which was identified, fatty liver.\par

## 2021-03-11 NOTE — ASSESSMENT
[FreeTextEntry1] : Ms. AUDREY BALDERAS is 70 year old female who presents for the follow up appointment with Elevated Liver enzymes with PBC on Ursodiol. \par \par # Elevated Liver Enzymes - Labs reviewed from 02/23/21 shows elevations in AST/ALT/ALP with WDL Tbil. ALP was WDL since 2016. Elevations seen most probably related to the DILI. Reconciled the medications listed and reviewed the need of medications. Advised on the OTC pain medications and the safe doses to avoid any drug induced liver injury. PLAN to repeat the CMP in 4 weeks and call back to discuss the results over the phone after stopping Atorvastatin. \par \par # Primary biliary cholangitis- Had normal  Alkaline phosphatase since 2016 on an appropriate dose of ursodiol 250 mg TID.  Ms. BALDERAS was educated on primary biliary cholangitis. Reviewed the natural history of the disease and discussed at length regarding associated conditions. Read back the mediation regimen for treatment, the rationale for use of Ursodiol. Side effects of the Ursodiol like Headache, Dizziness, Diarrhea, constipation, Dyspepsia, Nausea, Back pain, URTI and that therapy will be lifelong. Recommended avoidance of hepatotoxins. Health maintenance encouraged by taking a Daily multivitamin.\par \par # Elevated Lipids - Reviewed the labs from 12/04/20 shows WDL panel except Low HDL. Advised to F/U with PCP if the Lipids are elevated off Atorvastatin and can restart on hydrophilic statins once the liver enzymes are normalized. \par \par # Fatty Liver - Reviewed EUS shown to have fatty liver. AUDREY was educated about the fatty liver disease. Reviewed the spectrum of disease, the risk of disease progression to developing cirrhosis and the associated complications. Explained the patient may develop liver cancer without cirrhosis and therefore should be under the yearly surveillance with an abdominal ultrasound. Taught back that the best treatment of fatty liver disease is diet and exercise. Discussed the present diet with the patient and recommended the avoidance of fatty foods and to follow a heart healthy diet. Also explained that weight loss may lead to an improvement in the overall underlying liver disease. Taught back the physiology of alcohol abstinence has a important contribution to liver health. \par \par # Immunity - Will check Ab to HAV and HBV. Discussed the concerns of COVID vaccine and answered the questions to the best of my knowledge. Encouraged to take vaccine when available or offered to public. Advised to call PCP with any side effects or concerns from the vaccine itself. \par \par PLAN to F/U with HB in June 2021 as planned.\par Encouraged to call back in the interim with any issues or concerns so that we can address and assist as required.

## 2021-03-24 ENCOUNTER — TRANSCRIPTION ENCOUNTER (OUTPATIENT)
Age: 71
End: 2021-03-24

## 2021-04-16 ENCOUNTER — NON-APPOINTMENT (OUTPATIENT)
Age: 71
End: 2021-04-16

## 2021-04-21 LAB
HAV IGM SER QL: NONREACTIVE
HBV CORE IGM SER QL: NONREACTIVE
HBV SURFACE AG SER QL: NONREACTIVE
HCV AB SER QL: NONREACTIVE
HCV S/CO RATIO: 0.17 S/CO

## 2021-06-04 ENCOUNTER — APPOINTMENT (OUTPATIENT)
Dept: HEPATOLOGY | Facility: CLINIC | Age: 71
End: 2021-06-04
Payer: MEDICARE

## 2021-06-04 VITALS
WEIGHT: 135 LBS | DIASTOLIC BLOOD PRESSURE: 72 MMHG | HEIGHT: 60 IN | OXYGEN SATURATION: 94 % | HEART RATE: 75 BPM | SYSTOLIC BLOOD PRESSURE: 154 MMHG | BODY MASS INDEX: 26.5 KG/M2

## 2021-06-04 PROCEDURE — 99214 OFFICE O/P EST MOD 30 MIN: CPT | Mod: 25

## 2021-06-04 PROCEDURE — 36415 COLL VENOUS BLD VENIPUNCTURE: CPT

## 2021-06-04 RX ORDER — PRAVASTATIN SODIUM 40 MG/1
40 TABLET ORAL
Qty: 90 | Refills: 0 | Status: ACTIVE | COMMUNITY
Start: 2021-04-16

## 2021-06-04 RX ORDER — MULTIVITAMIN WITH FOLIC ACID 400 MCG
TABLET ORAL
Refills: 0 | Status: DISCONTINUED | COMMUNITY
Start: 2021-03-11 | End: 2021-06-04

## 2021-06-09 LAB
ALBUMIN SERPL ELPH-MCNC: 4.4 G/DL
ALP BLD-CCNC: 85 U/L
ALT SERPL-CCNC: 26 U/L
ANA PAT FLD IF-IMP: ABNORMAL
ANA SER IF-ACNC: ABNORMAL
ANION GAP SERPL CALC-SCNC: 12 MMOL/L
AST SERPL-CCNC: 21 U/L
BASOPHILS # BLD AUTO: 0.03 K/UL
BASOPHILS NFR BLD AUTO: 0.6 %
BILIRUB SERPL-MCNC: 0.8 MG/DL
BUN SERPL-MCNC: 9 MG/DL
CALCIUM SERPL-MCNC: 9.8 MG/DL
CHLORIDE SERPL-SCNC: 104 MMOL/L
CO2 SERPL-SCNC: 26 MMOL/L
CREAT SERPL-MCNC: 0.7 MG/DL
DEPRECATED KAPPA LC FREE/LAMBDA SER: 0.91 RATIO
EOSINOPHIL # BLD AUTO: 0.21 K/UL
EOSINOPHIL NFR BLD AUTO: 4.5 %
GLUCOSE SERPL-MCNC: 90 MG/DL
HCT VFR BLD CALC: 43.8 %
HGB BLD-MCNC: 15.2 G/DL
IGA SER QL IEP: 212 MG/DL
IGG SER QL IEP: 1059 MG/DL
IGG4 SER-MCNC: 27 MG/DL
IGM SER QL IEP: 144 MG/DL
IMM GRANULOCYTES NFR BLD AUTO: 0.2 %
INR PPP: 0.96 RATIO
KAPPA LC CSF-MCNC: 1.69 MG/DL
KAPPA LC SERPL-MCNC: 1.54 MG/DL
LYMPHOCYTES # BLD AUTO: 1.77 K/UL
LYMPHOCYTES NFR BLD AUTO: 37.8 %
MAN DIFF?: NORMAL
MCHC RBC-ENTMCNC: 32.2 PG
MCHC RBC-ENTMCNC: 34.7 GM/DL
MCV RBC AUTO: 92.8 FL
MITOCHONDRIA AB SER IF-ACNC: NORMAL
MONOCYTES # BLD AUTO: 0.31 K/UL
MONOCYTES NFR BLD AUTO: 6.6 %
NEUTROPHILS # BLD AUTO: 2.35 K/UL
NEUTROPHILS NFR BLD AUTO: 50.3 %
PLATELET # BLD AUTO: 207 K/UL
POTASSIUM SERPL-SCNC: 4.3 MMOL/L
PROT SERPL-MCNC: 7 G/DL
PT BLD: 11.3 SEC
RBC # BLD: 4.72 M/UL
RBC # FLD: 12.7 %
SMOOTH MUSCLE AB SER QL IF: NORMAL
SODIUM SERPL-SCNC: 142 MMOL/L
SOLUBLE LIVER IGG SER IA-ACNC: 2.5
WBC # FLD AUTO: 4.68 K/UL

## 2021-06-09 NOTE — REVIEW OF SYSTEMS
[Feeling Tired] : feeling tired [Heartburn] : heartburn [Negative] : Heme/Lymph [Fever] : no fever [Chills] : no chills [Feeling Poorly] : not feeling poorly [Recent Weight Gain (___ Lbs)] : no recent weight gain [Recent Weight Loss (___ Lbs)] : no recent weight loss [Dry Eyes] : no dryness of the eyes [Diarrhea] : no diarrhea [FreeTextEntry7] : complains of left upper quadrant pain [FreeTextEntry9] : back pain; arm and leg pain

## 2021-06-09 NOTE — HISTORY OF PRESENT ILLNESS
[Moderate] : moderate in severity [Fatigue] : fatigue stable [Fever] : denies fever [Malaise] : denies malaise [Diffuse Joint Pain (Arthralgias)] : arthralgias worsened [Muscle Aches, Generalized (Myalgias)] : denies myalgias [Yellow Skin Or Eyes (Jaundice)] : denies jaundice [Abdominal Pain] : abdominal pain worsened [Urine Tests Nonspecific Abnormal Findings Biliuria] : denies dark urine [Light Colored Bowel Movement (Acholic Stools)] : denies pale stools [Insomnia] : denies insomnia [Skin: Rash] : denies rash [Itching (Pruritus)] : denies pruritus [Shortness Of Breath] : denies shortness of breath [Wt Gain ___ Lbs] : no recent weight gain [Wt Loss ___ Lbs] : no recent weight loss [de-identified] : This patient has history of having had a biopsy in April of 2015 showing granuloma diagnostic of primary biliary cholangitis.  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  The patient was treated with ursodiol and normalized.  Liver tests.  She had elevated IgM levels and alkaline phosphatase that responded to treatment.  The patient, however, has had some continuing elevation of aminotransferase values and most recently had elevation above 100.  The patient stopped atorvastatin, which she had been on for many years and liver tests improved.  The patient has now been restarted on pravastatin and biochemical liver tests, when last checked were normal, including alkaline phosphatase and aminotransferase values.  The patient continues on ursodiol at a dose of 750 mg a day.  The patient underwent MR E. evaluation in September of 2020 showing stage II-3fibrosis.  There was no evidence of liver mass.\par \par The patient has had osteoporosis diagnosed and vitamin D levels were low.  She is taking vitamin D supplements 1000 units daily.\par \par The patient was identified as having a pulmonary nodule which was evaluated and found to be stable not requiring immediate intervention.  The patient is on a chronic monitoring schedule.

## 2021-06-09 NOTE — PHYSICAL EXAM
[Liver Size (___ Cm)] : Liver size [unfilled] cm [Liver Palpable ___ Finger Breadths Below Costal Margin] : Liver edge was palpable [unfilled] finger breadths below costal margin [Non-Tender] : non-tender [Smooth] : smooth [General Appearance - In No Acute Distress] : in no acute distress [General Appearance - Alert] : alert [General Appearance - Well Nourished] : well nourished [General Appearance - Well Developed] : well developed [General Appearance - Well-Appearing] : healthy appearing [Sclera] : the sclera and conjunctiva were normal [Outer Ear] : the ears and nose were normal in appearance [Examination Of The Oral Cavity] : the lips and gums were normal [Neck Appearance] : the appearance of the neck was normal [Neck Cervical Mass (___cm)] : no neck mass was observed [Jugular Venous Distention Increased] : there was no jugular-venous distention [Thyroid Diffuse Enlargement] : the thyroid was not enlarged [Respiration, Rhythm And Depth] : normal respiratory rhythm and effort [Exaggerated Use Of Accessory Muscles For Inspiration] : no accessory muscle use [Auscultation Breath Sounds / Voice Sounds] : lungs were clear to auscultation bilaterally [Heart Rate And Rhythm] : heart rate was normal and rhythm regular [Heart Sounds] : normal S1 and S2 [Murmurs] : no murmurs [Edema] : there was no peripheral edema [Bowel Sounds] : normal bowel sounds [Abdomen Soft] : soft [Abdomen Tenderness] : non-tender [] : no hepato-splenomegaly [Abdomen Mass (___ Cm)] : no abdominal mass palpated [Supraclavicular Lymph Nodes Enlarged Bilaterally] : supraclavicular [No CVA Tenderness] : no ~M costovertebral angle tenderness [Nail Clubbing] : no clubbing  or cyanosis of the fingernails [Involuntary Movements] : no involuntary movements were seen [Skin Color & Pigmentation] : normal skin color and pigmentation [Skin Turgor] : normal skin turgor [No Focal Deficits] : no focal deficits [Oriented To Time, Place, And Person] : oriented to person, place, and time [Scleral Icterus] : No Scleral Icterus [Hepatojugular Reflux] : patient did not have a sustained hepatojugular reflux [Spider Angioma] : No spider angioma(s) were observed [Abdominal Bruit] : no abdominal bruit [Abdominal  Ascites] : no ascites [Ascites Fluid Wave] : no ascites fluid wave [Ascites Tense] : ascites is not tense [Ascites Shifting Dullness] : no shifting dullness of ascites [Splenomegaly] : no splenomegaly [Caput Medusae] : no caput medusae observed [Asterixis] : no asterixis observed [Jaundice] : No jaundice [Palmar Erythema] : no Palmar Erythema [FreeTextEntry1] : eczema on hands

## 2021-06-09 NOTE — ASSESSMENT
[FreeTextEntry1] : This patient underwent liver biopsy, characteristic of primary biliary cholangitis.  She has responded ursodiol and currently all biochemical liver tests were normal.  On last testing.  The patient does have stage 2-3 fibrosis on recent MR Elastography.  The patient will be monitored for the activity of her liver disease.\par \par The patient has a identified pulmonary nodule now being followed by pulmonary area.\par \par The patient has osteoporosis.  I have advised that she increase her vitamin D supplementation.  2 2000 units per day.\par \par The patient complains of decreased range of motion and pain in her back arms and legs.  I suggested she speak to her primary care physician about, physical therapy to increase range of motion.  I will see the patient back in the office in 4 months.

## 2021-06-10 LAB
MISCELLANEOUS TEST: NORMAL
PROC NAME: NORMAL

## 2021-07-23 ENCOUNTER — NON-APPOINTMENT (OUTPATIENT)
Age: 71
End: 2021-07-23

## 2021-08-02 ENCOUNTER — APPOINTMENT (OUTPATIENT)
Dept: PULMONOLOGY | Facility: CLINIC | Age: 71
End: 2021-08-02
Payer: MEDICARE

## 2021-08-02 VITALS — OXYGEN SATURATION: 97 % | HEART RATE: 68 BPM | SYSTOLIC BLOOD PRESSURE: 130 MMHG | DIASTOLIC BLOOD PRESSURE: 70 MMHG

## 2021-08-02 VITALS — BODY MASS INDEX: 27.09 KG/M2 | WEIGHT: 138 LBS | HEIGHT: 60 IN

## 2021-08-02 DIAGNOSIS — Z23 ENCOUNTER FOR IMMUNIZATION: ICD-10-CM

## 2021-08-02 PROCEDURE — 94729 DIFFUSING CAPACITY: CPT

## 2021-08-02 PROCEDURE — 94727 GAS DIL/WSHOT DETER LNG VOL: CPT

## 2021-08-02 PROCEDURE — 94010 BREATHING CAPACITY TEST: CPT

## 2021-08-02 PROCEDURE — 99406 BEHAV CHNG SMOKING 3-10 MIN: CPT

## 2021-08-02 PROCEDURE — 85018 HEMOGLOBIN: CPT | Mod: QW

## 2021-08-02 PROCEDURE — 99214 OFFICE O/P EST MOD 30 MIN: CPT | Mod: 25

## 2021-08-02 RX ORDER — ALBUTEROL SULFATE 90 UG/1
108 (90 BASE) INHALANT RESPIRATORY (INHALATION)
Qty: 8 | Refills: 0 | Status: ACTIVE | COMMUNITY
Start: 2021-03-05

## 2021-08-02 NOTE — DISCUSSION/SUMMARY
[FreeTextEntry1] : Mild anatomic emphysema, Nicotine addiction, multiple lung nodules\par PET scan without any significant uptake Lung, CT 2/21 no change MOSHE lobulated density from 2016\par Lung exam is normal normal sp02\par  PFTs are normal\par Cardiology follow up\par smoking cessation, nicotine replacement reviewed 4 min\par Had Moderna vaccine\par 6 months or sooner if needed, will contact with above results, repeat Lung cancer screening 2/22\par \par

## 2021-08-02 NOTE — PHYSICAL EXAM
[No Acute Distress] : no acute distress [Normal Oropharynx] : normal oropharynx [Normal Appearance] : normal appearance [Normal Rate/Rhythm] : normal rate/rhythm [Normal Pulses] : normal pulses [Normal S1, S2] : normal s1, s2 [No Murmurs] : no murmurs [No Resp Distress] : no resp distress [No Acc Muscle Use] : no acc muscle use [Normal Rhythm and Effort] : normal rhythm and effort [Clear to Auscultation Bilaterally] : clear to auscultation bilaterally [Normal Gait] : normal gait [No Clubbing] : no clubbing [No Cyanosis] : no cyanosis [No Edema] : no edema [No Focal Deficits] : no focal deficits [Oriented x3] : oriented x3 [Normal Affect] : normal affect Spine appears normal, movement of extremities grossly intact.

## 2021-08-02 NOTE — PROCEDURE
[FreeTextEntry1] : CT scan 9/20 and compared to [facility] scans 2018, 2019\par \par PFTs are normal\par

## 2021-08-02 NOTE — CONSULT LETTER
[Dear  ___] : Dear  [unfilled], [Consult Letter:] : I had the pleasure of evaluating your patient, [unfilled]. [Please see my note below.] : Please see my note below. [Sincerely,] : Sincerely, [DrAdi  ___] : Dr. VINES [FreeTextEntry3] : Wilmar [facility] DO [facility]\par Pulmonary Critical Care\par Director Pulmonary Division\par Medical Director Respiratory Therapy\par [facility]\par \par

## 2021-10-29 ENCOUNTER — APPOINTMENT (OUTPATIENT)
Dept: HEPATOLOGY | Facility: CLINIC | Age: 71
End: 2021-10-29
Payer: MEDICARE

## 2021-10-29 VITALS
SYSTOLIC BLOOD PRESSURE: 150 MMHG | WEIGHT: 140 LBS | OXYGEN SATURATION: 74 % | HEART RATE: 74 BPM | DIASTOLIC BLOOD PRESSURE: 80 MMHG | BODY MASS INDEX: 27.48 KG/M2 | HEIGHT: 60 IN

## 2021-10-29 DIAGNOSIS — M85.80 OTHER SPECIFIED DISORDERS OF BONE DENSITY AND STRUCTURE, UNSPECIFIED SITE: ICD-10-CM

## 2021-10-29 PROCEDURE — 99214 OFFICE O/P EST MOD 30 MIN: CPT

## 2021-10-29 RX ORDER — HALOBETASOL PROPIONATE 0.5 MG/G
0.05 CREAM TOPICAL
Qty: 50 | Refills: 0 | Status: DISCONTINUED | COMMUNITY
Start: 2021-06-14 | End: 2021-10-29

## 2021-10-29 RX ORDER — LEVOCETIRIZINE DIHYDROCHLORIDE 5 MG/1
TABLET, FILM COATED ORAL
Refills: 0 | Status: DISCONTINUED | COMMUNITY
End: 2021-10-29

## 2021-10-29 NOTE — REVIEW OF SYSTEMS
[Fever] : no fever [Chills] : no chills [Feeling Poorly] : not feeling poorly [Feeling Tired] : feeling tired [Recent Weight Gain (___ Lbs)] : recent [unfilled] ~Ulb weight gain [Recent Weight Loss (___ Lbs)] : no recent weight loss [As Noted in HPI] : as noted in HPI [Limb Pain] : limb pain [Negative] : Heme/Lymph

## 2021-10-29 NOTE — HISTORY OF PRESENT ILLNESS
[Moderate] : moderate in severity [Unchanged] : unchanged [Fatigue] : fatigue stable [Malaise] : denies malaise [Fever] : denies fever [Diffuse Joint Pain (Arthralgias)] : denies arthralgias [Muscle Aches, Generalized (Myalgias)] : denies myalgias [Yellow Skin Or Eyes (Jaundice)] : denies jaundice [Abdominal Pain] : denies abdominal pain [Urine Tests Nonspecific Abnormal Findings Biliuria] : denies dark urine [Light Colored Bowel Movement (Acholic Stools)] : denies pale stools [Insomnia] : denies insomnia [Skin: Rash] : denies rash [Itching (Pruritus)] : denies pruritus [Shortness Of Breath] : denies shortness of breath [Wt Gain ___ Lbs] : recent [unfilled] ~Upound(s) weight gain [Wt Loss ___ Lbs] : no recent weight loss [de-identified] : This patient has history of primary biliary cholangitis documented on liver biopsy performed in April of 2015.  The patient has potential for overlap syndrome with autoimmune hepatitis.  However aminotransferase values normalized on ursodiol.\par \par The patient is on ursodiol 750 mg per day and alkaline phosphatase levels are normal.  The patient does not have significant itching.  No dryness of her eyes or mouth.  She does have occasional fatigue, but no depression.  Platelet count is normal at 219,000.  MR Elastography was performed, which showed stage II-3 fibrosis.\par \par Recent laboratory testing has shown some increase in blood, lipids, and the patient has gained about 10 pounds over the past 2 years.  She began pravastatin, but developed neuropathy symptoms in her arms and was prescribed gabapentin.  She has now discontinued, gabapentin.

## 2021-10-29 NOTE — ASSESSMENT
[FreeTextEntry1] : This patient with primary biliary cholangitis.  Has normal alkaline phosphatase values on ursodiol.  The patient has gained 10 pounds and blood lipids show increase in cholesterol.  I have advised that she focused her efforts on weight reduction.  The patient will continue ursodiol and have followup visit in 6 months.  The patient continues on vitamin D supplementation and is advised to take 2000 units vitamin D3 on a daily basis and bone density.  Assessment had shown osteoporosis and vitamin D, and bone density levels will be followed subsequently.

## 2022-03-03 ENCOUNTER — APPOINTMENT (OUTPATIENT)
Dept: PULMONOLOGY | Facility: CLINIC | Age: 72
End: 2022-03-03
Payer: MEDICARE

## 2022-03-03 VITALS
SYSTOLIC BLOOD PRESSURE: 134 MMHG | HEART RATE: 72 BPM | WEIGHT: 137 LBS | OXYGEN SATURATION: 98 % | DIASTOLIC BLOOD PRESSURE: 72 MMHG | BODY MASS INDEX: 26.76 KG/M2 | RESPIRATION RATE: 14 BRPM

## 2022-03-03 PROCEDURE — 99406 BEHAV CHNG SMOKING 3-10 MIN: CPT

## 2022-03-03 PROCEDURE — 99215 OFFICE O/P EST HI 40 MIN: CPT | Mod: 25

## 2022-03-03 NOTE — PROCEDURE
[FreeTextEntry1] : CT scan 2/21 to 2016 stable MOSHE tubular density\par \par PFTs 8/21 are normal\par

## 2022-03-03 NOTE — HISTORY OF PRESENT ILLNESS
[TextBox_4] : sent for abnormal CT scan\par hx Primary biliary cholangitis , followed by Hepatology\par smoker x 40 yrs, still smoking\par mild exertional dyspnea chronic, \par Also followed by cardiology\par No fevers, chill, chest pain\par no purulent sputum\par no hx TB exposure\par \par

## 2022-03-03 NOTE — CONSULT LETTER
[Dear  ___] : Dear  [unfilled], [Consult Letter:] : I had the pleasure of evaluating your patient, [unfilled]. [Please see my note below.] : Please see my note below. [Sincerely,] : Sincerely, [DrAdi  ___] : Dr. VINES [FreeTextEntry3] : [facility] DO [facility]\par Pulmonary Critical Care\par Director Pulmonary Division\par Medical Director Respiratory Therapy\par [facility]\par \par

## 2022-03-03 NOTE — DISCUSSION/SUMMARY
[FreeTextEntry1] : Mild anatomic emphysema, Nicotine addiction, multiple lung nodules\par PET scan without any significant uptake Lung, CT 2/21 no change MOSHE lobulated density from 2016\par Continue lung cance rscreening 2/22\par Lung exam is normal normal sp02\par  PFTs were normal, will repeat at follow up\par Cardiology following Dr Joiner\par smoking cessation, nicotine replacement reviewed 4 min\par Had Moderna vaccine x 3\par 6 months or sooner if needed, will contact with above results, repeat Lung cancer screening 2/22\par \par

## 2022-04-22 ENCOUNTER — APPOINTMENT (OUTPATIENT)
Dept: HEPATOLOGY | Facility: CLINIC | Age: 72
End: 2022-04-22

## 2022-06-06 ENCOUNTER — APPOINTMENT (OUTPATIENT)
Dept: HEPATOLOGY | Facility: CLINIC | Age: 72
End: 2022-06-06
Payer: MEDICARE

## 2022-06-06 VITALS
HEART RATE: 72 BPM | BODY MASS INDEX: 25.72 KG/M2 | WEIGHT: 131 LBS | RESPIRATION RATE: 15 BRPM | DIASTOLIC BLOOD PRESSURE: 73 MMHG | HEIGHT: 60 IN | SYSTOLIC BLOOD PRESSURE: 157 MMHG

## 2022-06-06 PROCEDURE — 99213 OFFICE O/P EST LOW 20 MIN: CPT

## 2022-06-06 RX ORDER — CHLORHEXIDINE GLUCONATE 4 %
5 LIQUID (ML) TOPICAL AT BEDTIME
Refills: 0 | Status: DISCONTINUED | COMMUNITY
Start: 2021-03-11 | End: 2022-06-06

## 2022-06-06 NOTE — ASSESSMENT
[FreeTextEntry1] : Lakiesha Raygoza 71 yoF with primary biliary cholangitis doing well.   Has normal alkaline phosphatase values on ursodiol. Recommended she continues with ursodiol 750 mg daily. Abdominal US ordered to screen for HCC and she will call me to discuss result. If no significant abnormalities, F/U in 6 months.

## 2022-06-06 NOTE — REVIEW OF SYSTEMS
[As Noted in HPI] : as noted in HPI [Feeling Tired] : feeling tired [Limb Pain] : limb pain [Fever] : no fever [Chills] : no chills [Itching] : no itching [Negative] : Heme/Lymph

## 2022-06-06 NOTE — HISTORY OF PRESENT ILLNESS
[Moderate] : moderate in severity [Unchanged] : unchanged [Fatigue] : fatigue stable [Malaise] : denies malaise [Fever] : denies fever [Diffuse Joint Pain (Arthralgias)] : denies arthralgias [Muscle Aches, Generalized (Myalgias)] : denies myalgias [Yellow Skin Or Eyes (Jaundice)] : denies jaundice [Abdominal Pain] : denies abdominal pain [Light Colored Bowel Movement (Acholic Stools)] : denies pale stools [Urine Tests Nonspecific Abnormal Findings Biliuria] : denies dark urine [Insomnia] : denies insomnia [Skin: Rash] : denies rash [Itching (Pruritus)] : denies pruritus [Shortness Of Breath] : denies shortness of breath [Wt Gain ___ Lbs] : recent [unfilled] ~Upound(s) weight gain [Wt Loss ___ Lbs] : no recent weight loss [de-identified] : This patient has history of primary biliary cholangitis documented on liver biopsy performed in April of 2015.  The patient has potential for overlap syndrome with autoimmune hepatitis.  However aminotransferase values normalized on ursodiol.\par \par The patient is on ursodiol 750 mg per day and alkaline phosphatase levels are normal.  Denies itching.  No dryness of her eyes or mouth.  She does have occasional fatigue, but no depression.  Platelet count is normal at 229,000.  MR Elastography from 9/4/20 showed stage 2-3 fibrosis.\par \par Last abdominal imaging was in 2020. Recent laboratory testing 6/1/22 showed ALT 19, AST 17, ALP 89, A1c 5.2. Taking pravastatin for HLD.

## 2022-06-06 NOTE — PHYSICAL EXAM
[Liver Size (___ Cm)] : Liver size [unfilled] cm [Liver Palpable ___ Finger Breadths Below Costal Margin] : Liver edge was palpable [unfilled] finger breadths below costal margin [Non-Tender] : non-tender [Smooth] : smooth [General Appearance - Alert] : alert [General Appearance - In No Acute Distress] : in no acute distress [General Appearance - Well Nourished] : well nourished [General Appearance - Well Developed] : well developed [General Appearance - Well-Appearing] : healthy appearing [Sclera] : the sclera and conjunctiva were normal [Neck Appearance] : the appearance of the neck was normal [Neck Cervical Mass (___cm)] : no neck mass was observed [Jugular Venous Distention Increased] : there was no jugular-venous distention [Thyroid Diffuse Enlargement] : the thyroid was not enlarged [Respiration, Rhythm And Depth] : normal respiratory rhythm and effort [Exaggerated Use Of Accessory Muscles For Inspiration] : no accessory muscle use [Auscultation Breath Sounds / Voice Sounds] : lungs were clear to auscultation bilaterally [Heart Rate And Rhythm] : heart rate was normal and rhythm regular [Heart Sounds] : normal S1 and S2 [Murmurs] : no murmurs [Edema] : there was no peripheral edema [Bowel Sounds] : normal bowel sounds [Abdomen Soft] : soft [Abdomen Tenderness] : non-tender [] : no hepato-splenomegaly [Abdomen Mass (___ Cm)] : no abdominal mass palpated [Supraclavicular Lymph Nodes Enlarged Bilaterally] : supraclavicular [Nail Clubbing] : no clubbing  or cyanosis of the fingernails [Involuntary Movements] : no involuntary movements were seen [Skin Turgor] : normal skin turgor [No Focal Deficits] : no focal deficits [Oriented To Time, Place, And Person] : oriented to person, place, and time [Scleral Icterus] : No Scleral Icterus [Hepatojugular Reflux] : patient did not have a sustained hepatojugular reflux [Spider Angioma] : No spider angioma(s) were observed [Abdominal Bruit] : no abdominal bruit [Abdominal  Ascites] : no ascites [Ascites Fluid Wave] : no ascites fluid wave [Ascites Tense] : ascites is not tense [Ascites Shifting Dullness] : no shifting dullness of ascites [Splenomegaly] : no splenomegaly [Caput Medusae] : no caput medusae observed [Asterixis] : no asterixis observed [Jaundice] : No jaundice [Palmar Erythema] : no Palmar Erythema

## 2022-06-10 ENCOUNTER — APPOINTMENT (OUTPATIENT)
Dept: HEPATOLOGY | Facility: CLINIC | Age: 72
End: 2022-06-10

## 2022-07-26 ENCOUNTER — NON-APPOINTMENT (OUTPATIENT)
Age: 72
End: 2022-07-26

## 2022-07-28 ENCOUNTER — NON-APPOINTMENT (OUTPATIENT)
Age: 72
End: 2022-07-28

## 2022-08-01 ENCOUNTER — OUTPATIENT (OUTPATIENT)
Dept: OUTPATIENT SERVICES | Facility: HOSPITAL | Age: 72
LOS: 1 days | End: 2022-08-01
Payer: MEDICARE

## 2022-08-01 DIAGNOSIS — Z00.8 ENCOUNTER FOR OTHER GENERAL EXAMINATION: ICD-10-CM

## 2022-08-01 PROCEDURE — 75574 CT ANGIO HRT W/3D IMAGE: CPT

## 2022-08-02 PROCEDURE — 75574 CT ANGIO HRT W/3D IMAGE: CPT | Mod: 26,MH

## 2022-08-03 ENCOUNTER — APPOINTMENT (OUTPATIENT)
Dept: CT IMAGING | Facility: CLINIC | Age: 72
End: 2022-08-03

## 2022-09-14 ENCOUNTER — OFFICE (OUTPATIENT)
Dept: URBAN - METROPOLITAN AREA CLINIC 94 | Facility: CLINIC | Age: 72
Setting detail: OPHTHALMOLOGY
End: 2022-09-14
Payer: MEDICARE

## 2022-09-14 DIAGNOSIS — H25.13: ICD-10-CM

## 2022-09-14 DIAGNOSIS — H35.013: ICD-10-CM

## 2022-09-14 DIAGNOSIS — H17.9: ICD-10-CM

## 2022-09-14 PROCEDURE — 99204 OFFICE O/P NEW MOD 45 MIN: CPT | Performed by: OPHTHALMOLOGY

## 2022-09-14 PROCEDURE — 92025 CPTRIZED CORNEAL TOPOGRAPHY: CPT | Performed by: OPHTHALMOLOGY

## 2022-09-14 PROCEDURE — 92250 FUNDUS PHOTOGRAPHY W/I&R: CPT | Performed by: OPHTHALMOLOGY

## 2022-09-14 ASSESSMENT — KERATOMETRY
OS_AXISANGLE_DEGREES: 037
OS_K1POWER_DIOPTERS: 37.50
OD_K2POWER_DIOPTERS: 40.00
OD_AXISANGLE_DEGREES: 003
OS_K2POWER_DIOPTERS: 37.75
OD_K1POWER_DIOPTERS: 39.25

## 2022-09-14 ASSESSMENT — CONFRONTATIONAL VISUAL FIELD TEST (CVF)
OD_FINDINGS: FULL
OS_FINDINGS: FULL

## 2022-09-14 ASSESSMENT — AXIALLENGTH_DERIVED
OD_AL: 25.7283
OD_AL: 25.7864
OS_AL: 25.28
OS_AL: 25.22

## 2022-09-14 ASSESSMENT — REFRACTION_MANIFEST
OU_VA: 20/20
OD_VA1: 20/20
OD_ADD: +2.25
OS_SPHERE: +1.75
OS_ADD: +2.25
OS_CYLINDER: -0.25
OD_CYLINDER: -0.75
OS_VA2: 20/20
OS_VA1: 20/25
OD_VA2: 20/20
OD_SPHERE: -1.00
OS_AXIS: 115
OD_AXIS: 080

## 2022-09-14 ASSESSMENT — SPHEQUIV_DERIVED
OD_SPHEQUIV: -1.5
OS_SPHEQUIV: 1.5
OD_SPHEQUIV: -1.375
OS_SPHEQUIV: 1.625

## 2022-09-14 ASSESSMENT — REFRACTION_AUTOREFRACTION
OD_CYLINDER: -1.00
OS_CYLINDER: -0.50
OS_SPHERE: +1.75
OD_SPHERE: -1.00
OD_AXIS: 078
OS_AXIS: 114

## 2022-09-14 ASSESSMENT — VISUAL ACUITY
OD_BCVA: 20/60
OS_BCVA: 20/20

## 2022-09-14 ASSESSMENT — TONOMETRY
OD_IOP_MMHG: 19
OS_IOP_MMHG: 16

## 2022-12-01 ENCOUNTER — NON-APPOINTMENT (OUTPATIENT)
Age: 72
End: 2022-12-01

## 2023-01-06 ENCOUNTER — OFFICE (OUTPATIENT)
Dept: URBAN - METROPOLITAN AREA CLINIC 94 | Facility: CLINIC | Age: 73
Setting detail: OPHTHALMOLOGY
End: 2023-01-06
Payer: MEDICARE

## 2023-01-06 DIAGNOSIS — H25.12: ICD-10-CM

## 2023-01-06 DIAGNOSIS — H04.123: ICD-10-CM

## 2023-01-06 DIAGNOSIS — H17.9: ICD-10-CM

## 2023-01-06 DIAGNOSIS — H25.13: ICD-10-CM

## 2023-01-06 DIAGNOSIS — H35.373: ICD-10-CM

## 2023-01-06 DIAGNOSIS — H35.40: ICD-10-CM

## 2023-01-06 PROBLEM — H01.002 CHANGES IN RETINAL VASCULAR APPEARANCE; BOTH EYES: Status: ACTIVE | Noted: 2023-01-06

## 2023-01-06 PROBLEM — H25.11 CATARACT SENILE NUCLEAR SCLEROSIS; RIGHT EYE, LEFT EYE, BOTH EYES: Status: ACTIVE | Noted: 2023-01-06

## 2023-01-06 PROBLEM — H01.005 CHANGES IN RETINAL VASCULAR APPEARANCE; BOTH EYES: Status: ACTIVE | Noted: 2023-01-06

## 2023-01-06 PROCEDURE — 92136 OPHTHALMIC BIOMETRY: CPT | Performed by: OPHTHALMOLOGY

## 2023-01-06 PROCEDURE — 92134 CPTRZ OPH DX IMG PST SGM RTA: CPT | Performed by: OPHTHALMOLOGY

## 2023-01-06 PROCEDURE — 99214 OFFICE O/P EST MOD 30 MIN: CPT | Performed by: OPHTHALMOLOGY

## 2023-01-06 ASSESSMENT — KERATOMETRY
OS_AXISANGLE_DEGREES: 110
OD_AXISANGLE_DEGREES: 75
OD_K1POWER_DIOPTERS: 39.75
OD_K1K2_AVERAGE: 39.875
OS_K1POWER_DIOPTERS: 37.75
OS_K1K2_AVERAGE: 38
OD_AXISANGLE2_DEGREES: 165
OS_K2POWER_DIOPTERS: 38.25
OS_CYLPOWER_DEGREES: 0.5
OS_AXISANGLE2_DEGREES: 020
OD_CYLAXISANGLE_DEGREES: 165
OD_K2POWER_DIOPTERS: 40.00
OD_K2POWER_DIOPTERS: 40.00
OD_CYLPOWER_DEGREES: 0.25
OS_AXISANGLE_DEGREES: 020
OD_AXISANGLE_DEGREES: 165
OS_CYLAXISANGLE_DEGREES: 020
OD_K1POWER_DIOPTERS: 39.75
OS_K2POWER_DIOPTERS: 38.25
OS_K1POWER_DIOPTERS: 37.75

## 2023-01-06 ASSESSMENT — REFRACTION_MANIFEST
OD_CYLINDER: -0.75
OD_VA1: 20/20
OS_SPHERE: +1.75
OS_ADD: +2.25
OS_VA2: 20/20
OS_VA1: 20/25
OD_ADD: +2.25
OD_AXIS: 080
OS_AXIS: 115
OD_SPHERE: -1.00
OD_VA2: 20/20
OS_CYLINDER: -0.25
OU_VA: 20/20

## 2023-01-06 ASSESSMENT — SPHEQUIV_DERIVED
OS_SPHEQUIV: 1.625
OD_SPHEQUIV: -1.625
OD_SPHEQUIV: -1.375
OS_SPHEQUIV: 1.625

## 2023-01-06 ASSESSMENT — REFRACTION_AUTOREFRACTION
OS_AXIS: 130
OS_CYLINDER: -0.75
OD_CYLINDER: -0.75
OD_SPHERE: -1.25
OS_SPHERE: +2.00
OD_AXIS: 077

## 2023-01-06 ASSESSMENT — TONOMETRY
OS_IOP_MMHG: 11
OD_IOP_MMHG: 15

## 2023-01-06 ASSESSMENT — VISUAL ACUITY
OS_BCVA: 20/40
OD_BCVA: 20/100

## 2023-01-06 ASSESSMENT — AXIALLENGTH_DERIVED
OD_AL: 25.6196
OS_AL: 25.0656
OS_AL: 25.0656
OD_AL: 25.735

## 2023-01-06 ASSESSMENT — CONFRONTATIONAL VISUAL FIELD TEST (CVF)
OS_FINDINGS: FULL
OD_FINDINGS: FULL

## 2023-01-17 ENCOUNTER — NON-APPOINTMENT (OUTPATIENT)
Age: 73
End: 2023-01-17

## 2023-01-20 ENCOUNTER — APPOINTMENT (OUTPATIENT)
Dept: PULMONOLOGY | Facility: CLINIC | Age: 73
End: 2023-01-20
Payer: MEDICARE

## 2023-01-20 VITALS
WEIGHT: 133 LBS | RESPIRATION RATE: 16 BRPM | SYSTOLIC BLOOD PRESSURE: 140 MMHG | DIASTOLIC BLOOD PRESSURE: 70 MMHG | HEART RATE: 76 BPM | BODY MASS INDEX: 25.98 KG/M2 | OXYGEN SATURATION: 96 %

## 2023-01-20 DIAGNOSIS — R00.0 TACHYCARDIA, UNSPECIFIED: ICD-10-CM

## 2023-01-20 PROCEDURE — 99406 BEHAV CHNG SMOKING 3-10 MIN: CPT

## 2023-01-20 PROCEDURE — 99214 OFFICE O/P EST MOD 30 MIN: CPT | Mod: 25

## 2023-01-20 RX ORDER — DULOXETINE HYDROCHLORIDE 30 MG/1
CAPSULE, DELAYED RELEASE ORAL
Refills: 0 | Status: COMPLETED | COMMUNITY
End: 2023-01-20

## 2023-01-20 RX ORDER — METOPROLOL TARTRATE 75 MG/1
TABLET, FILM COATED ORAL
Refills: 0 | Status: ACTIVE | COMMUNITY

## 2023-01-20 NOTE — DISCUSSION/SUMMARY
[FreeTextEntry1] : Mild anatomic emphysema, Nicotine addiction, multiple lung nodules\par PET scan without any significant uptake Lung, CT 6/22 stable nodules\par Post covid , now at baseline\par Continue lung cancer screening 2/23\par Lung exam is normal normal sp02\par  PFTs were normal, will repeat at follow up\par Cardiology following Dr Joiner\par smoking cessation, nicotine replacement reviewed 4 min\par Had Moderna vaccine x 3\par 6 months or sooner if needed, will contact with above results, repeat Lung cancer screening 6/23\par \par

## 2023-01-20 NOTE — HISTORY OF PRESENT ILLNESS
[TextBox_4] : Recent Covid\par now near baseline\par had palpitations, placed on metoprolol\par hx Primary biliary cholangitis , followed by Hepatology,on ursodiol\par smoker x 40 yrs, still smoking\par has thyroid nodules, followed by PMD\par no fever, chill, chest pain\par dyspnea much improved now near normal\par \par \par

## 2023-01-20 NOTE — CONSULT LETTER
[Dear  ___] : Dear  [unfilled], [Consult Letter:] : I had the pleasure of evaluating your patient, [unfilled]. [Please see my note below.] : Please see my note below. [Sincerely,] : Sincerely, [DrAdi  ___] : Dr. VINES [FreeTextEntry3] : Wilmar Lam DO [facility]\par Pulmonary Critical Care\par Director Pulmonary Division\par Medical Director Respiratory Therapy\par [facility]\par \par

## 2023-01-20 NOTE — PROCEDURE
[FreeTextEntry1] : CT scan 2/21 to 2016 stable MOSHE tubular density\par CT 6/22 emphysema, stable nodules\par \par PFTs 8/21 are normal\par

## 2023-02-03 ENCOUNTER — APPOINTMENT (OUTPATIENT)
Dept: HEPATOLOGY | Facility: CLINIC | Age: 73
End: 2023-02-03

## 2023-02-10 ENCOUNTER — OFFICE (OUTPATIENT)
Dept: URBAN - METROPOLITAN AREA CLINIC 94 | Facility: CLINIC | Age: 73
Setting detail: OPHTHALMOLOGY
End: 2023-02-10
Payer: MEDICARE

## 2023-02-10 DIAGNOSIS — Z01.812: ICD-10-CM

## 2023-02-10 DIAGNOSIS — Z20.822: ICD-10-CM

## 2023-02-10 PROCEDURE — 99211 OFF/OP EST MAY X REQ PHY/QHP: CPT | Performed by: OPHTHALMOLOGY

## 2023-02-13 ASSESSMENT — REFRACTION_MANIFEST
OS_VA2: 20/20
OS_ADD: +2.25
OS_CYLINDER: -0.25
OD_SPHERE: -1.00
OD_VA2: 20/20
OD_VA1: 20/20
OU_VA: 20/20
OD_AXIS: 080
OD_CYLINDER: -0.75
OS_AXIS: 115
OS_SPHERE: +1.75
OS_VA1: 20/25
OD_ADD: +2.25

## 2023-02-13 ASSESSMENT — AXIALLENGTH_DERIVED
OD_AL: 25.735
OS_AL: 25.0656
OS_AL: 25.0656
OD_AL: 25.6196

## 2023-02-13 ASSESSMENT — KERATOMETRY
OD_K2POWER_DIOPTERS: 40.00
OS_K2POWER_DIOPTERS: 38.25
OS_AXISANGLE_DEGREES: 020
OS_K1POWER_DIOPTERS: 37.75
OD_K1POWER_DIOPTERS: 39.75
OD_AXISANGLE_DEGREES: 165

## 2023-02-13 ASSESSMENT — REFRACTION_AUTOREFRACTION
OD_SPHERE: -1.25
OS_CYLINDER: -0.75
OD_CYLINDER: -0.75
OS_SPHERE: +2.00
OS_AXIS: 130
OD_AXIS: 077

## 2023-02-13 ASSESSMENT — SPHEQUIV_DERIVED
OS_SPHEQUIV: 1.625
OD_SPHEQUIV: -1.375
OD_SPHEQUIV: -1.625
OS_SPHEQUIV: 1.625

## 2023-02-13 ASSESSMENT — VISUAL ACUITY
OS_BCVA: 20/40
OD_BCVA: 20/100

## 2023-02-14 ENCOUNTER — AMBULATORY SURGERY CENTER (OUTPATIENT)
Dept: URBAN - METROPOLITAN AREA SURGERY 17 | Facility: SURGERY | Age: 73
Setting detail: OPHTHALMOLOGY
End: 2023-02-14
Payer: MEDICARE

## 2023-02-14 DIAGNOSIS — H52.212: ICD-10-CM

## 2023-02-14 DIAGNOSIS — H25.12: ICD-10-CM

## 2023-02-14 PROCEDURE — 66984 XCAPSL CTRC RMVL W/O ECP: CPT | Performed by: OPHTHALMOLOGY

## 2023-02-14 PROCEDURE — FEMTO CATARACT LASER: Performed by: OPHTHALMOLOGY

## 2023-02-14 PROCEDURE — A9270 NON-COVERED ITEM OR SERVICE: HCPCS | Performed by: OPHTHALMOLOGY

## 2023-02-15 ENCOUNTER — OFFICE (OUTPATIENT)
Dept: URBAN - METROPOLITAN AREA CLINIC 94 | Facility: CLINIC | Age: 73
Setting detail: OPHTHALMOLOGY
End: 2023-02-15
Payer: MEDICARE

## 2023-02-15 DIAGNOSIS — Z96.1: ICD-10-CM

## 2023-02-15 PROCEDURE — 99024 POSTOP FOLLOW-UP VISIT: CPT | Performed by: REGISTERED NURSE

## 2023-02-15 ASSESSMENT — REFRACTION_AUTOREFRACTION
OS_CYLINDER: -1.50
OS_AXIS: 142
OD_AXIS: 069
OS_SPHERE: +0.75
OD_SPHERE: -1.50
OD_CYLINDER: -1.00

## 2023-02-15 ASSESSMENT — KERATOMETRY
OD_AXISANGLE_DEGREES: 090
OD_K1POWER_DIOPTERS: 40.25
OS_K2POWER_DIOPTERS: 38.00
OD_K2POWER_DIOPTERS: 40.25
OS_AXISANGLE_DEGREES: 061
OS_K1POWER_DIOPTERS: 37.50

## 2023-02-15 ASSESSMENT — CONFRONTATIONAL VISUAL FIELD TEST (CVF)
OD_FINDINGS: FULL
OS_FINDINGS: FULL

## 2023-02-15 ASSESSMENT — SPHEQUIV_DERIVED
OD_SPHEQUIV: -2
OS_SPHEQUIV: 1.625
OD_SPHEQUIV: -1.375
OS_SPHEQUIV: 0

## 2023-02-15 ASSESSMENT — TONOMETRY
OS_IOP_MMHG: 18
OD_IOP_MMHG: 16

## 2023-02-15 ASSESSMENT — REFRACTION_MANIFEST
OD_CYLINDER: -0.75
OD_ADD: +2.25
OD_VA2: 20/20
OD_AXIS: 080
OU_VA: 20/20
OS_VA2: 20/20
OD_SPHERE: -1.00
OS_AXIS: 115
OS_CYLINDER: -0.25
OS_ADD: +2.25
OS_VA1: 20/25
OD_VA1: 20/20
OS_SPHERE: +1.75

## 2023-02-15 ASSESSMENT — CORNEAL EDEMA CLINICAL DESCRIPTION: OS_CORNEALEDEMA: T

## 2023-02-15 ASSESSMENT — VISUAL ACUITY
OD_BCVA: 20/40+1
OS_BCVA: 20/30

## 2023-02-15 ASSESSMENT — CORNEAL EDEMA - FOLDS/STRIAE: OS_FOLDSSTRIAE: T

## 2023-02-15 ASSESSMENT — AXIALLENGTH_DERIVED
OD_AL: 25.7449
OS_AL: 25.9116
OD_AL: 25.4582
OS_AL: 25.1697

## 2023-02-21 ENCOUNTER — OFFICE (OUTPATIENT)
Dept: URBAN - METROPOLITAN AREA CLINIC 116 | Facility: CLINIC | Age: 73
Setting detail: OPHTHALMOLOGY
End: 2023-02-21
Payer: MEDICARE

## 2023-02-21 ENCOUNTER — RX ONLY (RX ONLY)
Age: 73
End: 2023-02-21

## 2023-02-21 DIAGNOSIS — H25.11: ICD-10-CM

## 2023-02-21 DIAGNOSIS — Z96.1: ICD-10-CM

## 2023-02-21 PROCEDURE — 99024 POSTOP FOLLOW-UP VISIT: CPT | Performed by: OPTOMETRIST

## 2023-02-21 PROCEDURE — 92136 OPHTHALMIC BIOMETRY: CPT | Performed by: OPTOMETRIST

## 2023-02-21 ASSESSMENT — REFRACTION_MANIFEST
OU_VA: 20/20
OD_CYLINDER: -0.75
OS_ADD: +2.25
OS_VA1: 20/25
OS_CYLINDER: -0.25
OS_CYLINDER: SPH
OS_VA2: 20/20
OD_SPHERE: -1.00
OD_VA1: 20/20
OD_VA1: 20/25
OS_SPHERE: +1.75
OD_CYLINDER: SPH
OS_VA1: 20/25
OS_SPHERE: +0.75
OD_ADD: +2.25
OS_AXIS: 115
OD_VA2: 20/20
OD_AXIS: 080
OD_SPHERE: -1.00

## 2023-02-21 ASSESSMENT — CONFRONTATIONAL VISUAL FIELD TEST (CVF)
OS_FINDINGS: FULL
OD_FINDINGS: FULL

## 2023-02-21 ASSESSMENT — TONOMETRY
OD_IOP_MMHG: 19
OS_IOP_MMHG: 17

## 2023-02-21 ASSESSMENT — AXIALLENGTH_DERIVED
OS_AL: 25.1697
OS_AL: 25.853
OD_AL: 25.8547
OD_AL: 25.5656

## 2023-02-21 ASSESSMENT — SPHEQUIV_DERIVED
OS_SPHEQUIV: 0.125
OS_SPHEQUIV: 1.625
OD_SPHEQUIV: -1.375
OD_SPHEQUIV: -2

## 2023-02-21 ASSESSMENT — VISUAL ACUITY
OS_BCVA: 20/30
OD_BCVA: 20/30+

## 2023-02-21 ASSESSMENT — KERATOMETRY
OD_K1POWER_DIOPTERS: 39.75
OD_K2POWER_DIOPTERS: 40.25
OS_K1POWER_DIOPTERS: 37.50
OS_K2POWER_DIOPTERS: 38.00
OS_AXISANGLE_DEGREES: 020
OD_AXISANGLE_DEGREES: 175

## 2023-02-21 ASSESSMENT — REFRACTION_AUTOREFRACTION
OS_CYLINDER: -0.75
OD_SPHERE: -1.25
OS_SPHERE: +0.50
OS_AXIS: 140
OD_AXIS: 085
OD_CYLINDER: -1.50

## 2023-02-21 ASSESSMENT — CORNEAL EDEMA - FOLDS/STRIAE: OS_FOLDSSTRIAE: T

## 2023-02-21 ASSESSMENT — CORNEAL EDEMA CLINICAL DESCRIPTION: OS_CORNEALEDEMA: T

## 2023-02-27 ENCOUNTER — OFFICE (OUTPATIENT)
Dept: URBAN - METROPOLITAN AREA CLINIC 94 | Facility: CLINIC | Age: 73
Setting detail: OPHTHALMOLOGY
End: 2023-02-27
Payer: MEDICARE

## 2023-02-27 DIAGNOSIS — Z01.812: ICD-10-CM

## 2023-02-27 DIAGNOSIS — Z20.822: ICD-10-CM

## 2023-02-27 PROCEDURE — 99211 OFF/OP EST MAY X REQ PHY/QHP: CPT | Performed by: OPHTHALMOLOGY

## 2023-02-27 ASSESSMENT — REFRACTION_MANIFEST
OS_VA2: 20/20
OS_CYLINDER: -0.25
OD_SPHERE: -1.00
OS_AXIS: 115
OS_CYLINDER: SPH
OD_CYLINDER: -0.75
OS_ADD: +2.25
OU_VA: 20/20
OD_CYLINDER: SPH
OS_VA1: 20/25
OD_VA1: 20/20
OD_VA1: 20/25
OD_VA2: 20/20
OS_SPHERE: +0.75
OD_ADD: +2.25
OS_VA1: 20/25
OS_SPHERE: +1.75
OD_SPHERE: -1.00
OD_AXIS: 080

## 2023-02-27 ASSESSMENT — REFRACTION_AUTOREFRACTION
OS_AXIS: 140
OD_SPHERE: -1.25
OD_CYLINDER: -1.50
OS_CYLINDER: -0.75
OD_AXIS: 085
OS_SPHERE: +0.50

## 2023-02-27 ASSESSMENT — SPHEQUIV_DERIVED
OS_SPHEQUIV: 0.125
OD_SPHEQUIV: -2
OD_SPHEQUIV: -1.375
OS_SPHEQUIV: 1.625

## 2023-02-27 ASSESSMENT — KERATOMETRY
OD_K2POWER_DIOPTERS: 40.25
OS_K1POWER_DIOPTERS: 37.50
OD_AXISANGLE_DEGREES: 175
OD_K1POWER_DIOPTERS: 39.75
OS_K2POWER_DIOPTERS: 38.00
OS_AXISANGLE_DEGREES: 020

## 2023-02-27 ASSESSMENT — VISUAL ACUITY
OD_BCVA: 20/30+
OS_BCVA: 20/30

## 2023-02-27 ASSESSMENT — AXIALLENGTH_DERIVED
OD_AL: 25.5656
OS_AL: 25.853
OS_AL: 25.1697
OD_AL: 25.8547

## 2023-02-27 ASSESSMENT — CORNEAL EDEMA CLINICAL DESCRIPTION: OS_CORNEALEDEMA: T

## 2023-02-27 ASSESSMENT — CORNEAL EDEMA - FOLDS/STRIAE: OS_FOLDSSTRIAE: T

## 2023-03-02 ENCOUNTER — ASC (OUTPATIENT)
Dept: URBAN - METROPOLITAN AREA SURGERY 8 | Facility: SURGERY | Age: 73
Setting detail: OPHTHALMOLOGY
End: 2023-03-02
Payer: MEDICARE

## 2023-03-02 DIAGNOSIS — H25.11: ICD-10-CM

## 2023-03-02 DIAGNOSIS — H52.211: ICD-10-CM

## 2023-03-02 PROCEDURE — FEMTO WITH CATARACT LASER: Performed by: OPHTHALMOLOGY

## 2023-03-02 PROCEDURE — 66984 XCAPSL CTRC RMVL W/O ECP: CPT | Performed by: OPHTHALMOLOGY

## 2023-03-02 PROCEDURE — 68841 INSJ RX ELUT IMPLT LAC CANAL: CPT | Performed by: OPHTHALMOLOGY

## 2023-03-02 PROCEDURE — A9270 NON-COVERED ITEM OR SERVICE: HCPCS | Performed by: OPHTHALMOLOGY

## 2023-03-03 ENCOUNTER — OFFICE (OUTPATIENT)
Dept: URBAN - METROPOLITAN AREA CLINIC 116 | Facility: CLINIC | Age: 73
Setting detail: OPHTHALMOLOGY
End: 2023-03-03
Payer: MEDICARE

## 2023-03-03 DIAGNOSIS — Z96.1: ICD-10-CM

## 2023-03-03 PROCEDURE — 99024 POSTOP FOLLOW-UP VISIT: CPT | Performed by: OPTOMETRIST

## 2023-03-03 ASSESSMENT — REFRACTION_AUTOREFRACTION
OD_SPHERE: +0.50
OD_AXIS: 115
OD_CYLINDER: -2.00
OS_CYLINDER: -0.25
OS_SPHERE: +0.75
OS_AXIS: 135

## 2023-03-03 ASSESSMENT — KERATOMETRY
OD_K2POWER_DIOPTERS: 40.00
OD_K1POWER_DIOPTERS: 38.75
OD_AXISANGLE_DEGREES: 045
OS_K2POWER_DIOPTERS: 37.50
OS_K1POWER_DIOPTERS: 34.25
OS_AXISANGLE_DEGREES: 015

## 2023-03-03 ASSESSMENT — AXIALLENGTH_DERIVED
OS_AL: 26.46
OD_AL: 25.4355
OD_AL: 25.838
OS_AL: 25.98

## 2023-03-03 ASSESSMENT — SPHEQUIV_DERIVED
OS_SPHEQUIV: 1.625
OS_SPHEQUIV: 0.625
OD_SPHEQUIV: -0.5
OD_SPHEQUIV: -1.375

## 2023-03-03 ASSESSMENT — REFRACTION_MANIFEST
OD_VA2: 20/20
OS_VA1: 20/25
OD_ADD: +2.25
OS_SPHERE: +0.75
OS_AXIS: 115
OD_SPHERE: -1.00
OU_VA: 20/20
OS_VA1: 20/25
OD_CYLINDER: SPH
OD_VA1: 20/20
OS_CYLINDER: -0.25
OS_VA2: 20/20
OS_CYLINDER: SPH
OD_VA1: 20/25
OD_SPHERE: -1.00
OD_AXIS: 080
OS_ADD: +2.25
OD_CYLINDER: -0.75
OS_SPHERE: +1.75

## 2023-03-03 ASSESSMENT — CONFRONTATIONAL VISUAL FIELD TEST (CVF)
OS_FINDINGS: FULL
OD_FINDINGS: FULL

## 2023-03-03 ASSESSMENT — VISUAL ACUITY
OD_BCVA: 20/50
OS_BCVA: 20/30

## 2023-03-03 ASSESSMENT — CORNEAL EDEMA - FOLDS/STRIAE: OS_FOLDSSTRIAE: T

## 2023-03-03 ASSESSMENT — TONOMETRY
OD_IOP_MMHG: 14
OS_IOP_MMHG: 15

## 2023-03-03 ASSESSMENT — CORNEAL EDEMA CLINICAL DESCRIPTION: OS_CORNEALEDEMA: T

## 2023-03-07 ENCOUNTER — APPOINTMENT (OUTPATIENT)
Dept: HEPATOLOGY | Facility: CLINIC | Age: 73
End: 2023-03-07

## 2023-03-08 ENCOUNTER — OFFICE (OUTPATIENT)
Dept: URBAN - METROPOLITAN AREA CLINIC 94 | Facility: CLINIC | Age: 73
Setting detail: OPHTHALMOLOGY
End: 2023-03-08
Payer: MEDICARE

## 2023-03-08 ENCOUNTER — RX ONLY (RX ONLY)
Age: 73
End: 2023-03-08

## 2023-03-08 DIAGNOSIS — Z96.1: ICD-10-CM

## 2023-03-08 PROBLEM — H25.11 CATARACT SENILE NUCLEAR SCLEROSIS;  , RIGHT EYE: Status: RESOLVED | Noted: 2023-02-15 | Resolved: 2023-03-08

## 2023-03-08 PROCEDURE — 99024 POSTOP FOLLOW-UP VISIT: CPT | Performed by: PHYSICIAN ASSISTANT

## 2023-03-08 ASSESSMENT — CONFRONTATIONAL VISUAL FIELD TEST (CVF)
OS_FINDINGS: FULL
OD_FINDINGS: FULL

## 2023-03-08 ASSESSMENT — TONOMETRY
OD_IOP_MMHG: 18
OS_IOP_MMHG: 12
OS_IOP_MMHG: 18

## 2023-03-09 ASSESSMENT — REFRACTION_MANIFEST
OD_VA1: 20/20
OS_SPHERE: +0.25
OD_ADD: +2.25
OS_CYLINDER: -0.50
OD_CYLINDER: SPH
OD_SPHERE: -0.50
OD_SPHERE: -1.00
OD_CYLINDER: -0.75
OD_VA2: 20/20
OS_AXIS: 165
OS_SPHERE: +1.75
OD_VA1: 20/20
OS_ADD: +2.25
OU_VA: 20/20
OS_VA1: 20/25
OS_CYLINDER: -0.25
OS_VA2: 20/20
OS_VA1: 20/20
OS_AXIS: 115
OD_AXIS: 080

## 2023-03-09 ASSESSMENT — KERATOMETRY
OD_K2POWER_DIOPTERS: 40.00
OS_AXISANGLE_DEGREES: 059
OD_K1POWER_DIOPTERS: 39.50
OS_K1POWER_DIOPTERS: 37.25
OD_AXISANGLE_DEGREES: 067
OS_K2POWER_DIOPTERS: 38.00

## 2023-03-09 ASSESSMENT — REFRACTION_AUTOREFRACTION
OS_CYLINDER: -0.50
OD_AXIS: 089
OD_SPHERE: -1.00
OS_AXIS: 165
OS_SPHERE: +0.75
OD_CYLINDER: -0.50

## 2023-03-09 ASSESSMENT — SPHEQUIV_DERIVED
OD_SPHEQUIV: -1.375
OS_SPHEQUIV: 1.625
OD_SPHEQUIV: -1.25
OS_SPHEQUIV: 0.5
OS_SPHEQUIV: 0

## 2023-03-09 ASSESSMENT — AXIALLENGTH_DERIVED
OD_AL: 25.6163
OS_AL: 25.9671
OD_AL: 25.6738
OS_AL: 25.7332
OS_AL: 25.22

## 2023-03-09 ASSESSMENT — VISUAL ACUITY
OD_BCVA: 20/25-1
OS_BCVA: 20/25-1

## 2023-03-23 ENCOUNTER — APPOINTMENT (OUTPATIENT)
Dept: HEPATOLOGY | Facility: CLINIC | Age: 73
End: 2023-03-23
Payer: MEDICARE

## 2023-03-23 VITALS
WEIGHT: 135 LBS | HEART RATE: 71 BPM | RESPIRATION RATE: 16 BRPM | HEIGHT: 60 IN | OXYGEN SATURATION: 98 % | SYSTOLIC BLOOD PRESSURE: 143 MMHG | BODY MASS INDEX: 26.5 KG/M2 | DIASTOLIC BLOOD PRESSURE: 74 MMHG

## 2023-03-23 PROCEDURE — 99214 OFFICE O/P EST MOD 30 MIN: CPT

## 2023-03-23 RX ORDER — UBIDECARENONE 100 MG
100 CAPSULE ORAL
Refills: 0 | Status: DISCONTINUED | COMMUNITY
End: 2023-03-23

## 2023-03-23 RX ORDER — CALCIUM CARBONATE/VITAMIN D3 600 MG-20
600-800 TABLET ORAL
Refills: 0 | Status: DISCONTINUED | COMMUNITY
Start: 2021-03-11 | End: 2023-03-23

## 2023-03-23 NOTE — ASSESSMENT
[FreeTextEntry1] : Ms. AUDREY BALDERAS a 72 year White female with past medical hx is significant for primary biliary cholangitis documented on liver biopsy performed in April of 2015. The patient has potential for overlap syndrome with autoimmune hepatitis. However aminotransferase values normalized on ursodiol. She remains on Ursodiol 750 mg per day. No cholestatic symptoms. She is also taking Pravastatin for HLD. Platelet count is normal at 240,000. MR Elastography from 9/4/20 showed stage 2-3 fibrosis. However, last abdominal imaging was in June 2022. Subtle findings of cirrhosis but no focal hepatic lesion.\par \par PLAN\par # Cirrhosis\par Concern for cirrhosis based on US from June 2022. I have recommended a MRE with and without contrast.  I discussed the meaning of cirrhosis with the patient. I reviewed the natural history of the disease. I explained the risks for the development of esophageal varices with and without bleeding, hepatic encephalopathy, ascites, hepatocellular carcinoma, and liver failure. I have explained that disease can progress to the point of requiring an evaluation for liver transplantation.\par \par I have explained the need for imaging every 6 months to screen for liver cancer. She will need a MRE with and without contrast. \par \par Follow up labs next month including AFP. \par \par Ursodiol 250 mg tid to continue.\par \par # Variceal screening\par Platelets within normal range despite suggestion for cirrhosis on US of the abdomen. Would defer now. \par \par RTC in 6 months.

## 2023-03-23 NOTE — HISTORY OF PRESENT ILLNESS
[FreeTextEntry1] : Ms. AUDREY BALDERAS a 72 year White female  presents today for  a hepatology appointment. She has been a patient  [facility].\par \par Her past medical hx is significant for primary biliary cholangitis documented on liver biopsy performed in April of 2015. The patient has potential for overlap syndrome with autoimmune hepatitis. However aminotransferase values normalized on ursodiol. She remains on Ursodiol 750 mg per day. Denies itching. No dryness of her eyes or mouth. She does have occasional fatigue, but no depression.  She is also taking Pravastatin for HLD. She has recently been started with Metoprolol for " tachycardia" noted on Holter monitoring.\par \par Platelet count is normal at 240,000. MR Elastography from 9/4/20 showed stage 2-3 fibrosis.\par \par Last abdominal imaging was in June 2022. Subtle findings of cirrhosis but no focal hepatic lesion.\par \par Recent laboratory testing Jan 24, 2023 showed ALT 17, AST 17, ALP 94, A1c 5.2. \par \par CT abdomen form Sept 11, 2020- 1.0 x 0.7 cm nodule in the lingula inferiorly. If no old films available for comparison, PET/CT scan would be recommended for further investigation. Additional 3 mm nodules in the upper lobes. Stable compared to 2019.\par Mild lymphadenopathy in the upper abdomen as above. Lymph node adjacent to the suprahepatic IVC corresponds to the lesion in question on recent MRI. The questioned gastric masses or adjacent lymph nodes. These may be reactive or neoplastic. Clinical correlation is recommended. 4.1 x 3.0 cm complex cystic appearing right adnexal lesion. Recommend further evaluation with pelvic ultrasound. \par \par MRE Sept 9, 2020:  A 9 mm right lower lobe pulmonary nodule. Two left upper quadrant soft tissue lesions as above. Differential for the larger lesion includes an exophytic gastric lesion, gastrohepatic lymphadenopathy or less likely an exophytic hepatic lesion. CT of the chest and abdomen is recommended for further evaluation. \par \par Hepatic Fat Fraction: Normal. \par Hepatic Iron Deposition: None. \par Hepatic stiffness: 3.5-4 kPa: Stage 2 to 3 fibrosis \par \par

## 2023-03-23 NOTE — END OF VISIT
[FreeTextEntry3] : I saw and evaluated the patient with NP Jamar.  I discussed the care of this patient with the NP providing the service, and was directly responsible for the patient's management. My discussion with the NP included the patient's history, physical exam, laboratory findings, and medical decision-making. I agree with the documented findings and plan of care of the NP's note. Spent > 30 minutes collectively in reviewing records, performing patient history and physical examination, and formulating recommendations/plan of care.\par

## 2023-03-23 NOTE — PHYSICAL EXAM
[General Appearance - Alert] : alert [General Appearance - In No Acute Distress] : in no acute distress [Sclera] : the sclera and conjunctiva were normal [PERRL With Normal Accommodation] : pupils were equal in size, round, and reactive to light [Extraocular Movements] : extraocular movements were intact [Outer Ear] : the ears and nose were normal in appearance [Oropharynx] : the oropharynx was normal [Neck Appearance] : the appearance of the neck was normal [Neck Cervical Mass (___cm)] : no neck mass was observed [Jugular Venous Distention Increased] : there was no jugular-venous distention [Thyroid Diffuse Enlargement] : the thyroid was not enlarged [Thyroid Nodule] : there were no palpable thyroid nodules [Heart Rate And Rhythm] : heart rate was normal and rhythm regular [Heart Sounds] : normal S1 and S2 [Heart Sounds Gallop] : no gallops [Murmurs] : no murmurs [Heart Sounds Pericardial Friction Rub] : no pericardial rub [Bowel Sounds] : normal bowel sounds [Abdomen Soft] : soft [Abdomen Tenderness] : non-tender [] : no hepato-splenomegaly [Abdomen Mass (___ Cm)] : no abdominal mass palpated [Deep Tendon Reflexes (DTR)] : deep tendon reflexes were 2+ and symmetric [Sensation] : the sensory exam was normal to light touch and pinprick [No Focal Deficits] : no focal deficits [Oriented To Time, Place, And Person] : oriented to person, place, and time [Impaired Insight] : insight and judgment were intact [Affect] : the affect was normal

## 2023-05-01 ENCOUNTER — APPOINTMENT (OUTPATIENT)
Dept: MRI IMAGING | Facility: CLINIC | Age: 73
End: 2023-05-01
Payer: MEDICARE

## 2023-05-01 ENCOUNTER — OUTPATIENT (OUTPATIENT)
Dept: OUTPATIENT SERVICES | Facility: HOSPITAL | Age: 73
LOS: 1 days | End: 2023-05-01
Payer: MEDICARE

## 2023-05-01 ENCOUNTER — RESULT REVIEW (OUTPATIENT)
Age: 73
End: 2023-05-01

## 2023-05-01 DIAGNOSIS — K74.3 PRIMARY BILIARY CIRRHOSIS: ICD-10-CM

## 2023-05-01 PROCEDURE — 74183 MRI ABD W/O CNTR FLWD CNTR: CPT | Mod: MH

## 2023-05-01 PROCEDURE — 76391 MR ELASTOGRAPHY: CPT

## 2023-05-01 PROCEDURE — 74183 MRI ABD W/O CNTR FLWD CNTR: CPT | Mod: 26,MH

## 2023-05-01 PROCEDURE — A9585: CPT

## 2023-05-01 PROCEDURE — 76391 MR ELASTOGRAPHY: CPT | Mod: 26

## 2023-06-12 ENCOUNTER — NON-APPOINTMENT (OUTPATIENT)
Age: 73
End: 2023-06-12

## 2023-06-12 VITALS — HEIGHT: 60 IN | WEIGHT: 135 LBS | BODY MASS INDEX: 26.5 KG/M2

## 2023-06-12 NOTE — HISTORY OF PRESENT ILLNESS
[Current] : Current [TextBox_13] : Referred by Dr. Wilmar Lam.\par \par Ms. BALDERAS is a 72 year old female with a history of HLD, emphysema and COPD.\par \par She was called to review eligibility for Low-Dose CT lung cancer screening.  Reviewed and confirmed that the patient meets screening eligibility criteria:\par \par 72 years old \par \par Smoking Status:  Current Smoker\par \par Number of pack(s) per day: 1\par Number of years smoked: 30\par Number of pack years smokin\par \par No symptoms of lung cancer, including new cough, change in cough, hemoptysis, and unintentional weight loss.\par \par No personal history of lung cancer.  History of lung cancer in a first degree relative, her father.  No history of lung disease. [PacksperDay] : 1 [N_Years] : 30 [PacksperYear] : 30

## 2023-06-19 ENCOUNTER — APPOINTMENT (OUTPATIENT)
Dept: CT IMAGING | Facility: CLINIC | Age: 73
End: 2023-06-19
Payer: MEDICARE

## 2023-06-19 ENCOUNTER — OUTPATIENT (OUTPATIENT)
Dept: OUTPATIENT SERVICES | Facility: HOSPITAL | Age: 73
LOS: 1 days | End: 2023-06-19

## 2023-06-19 DIAGNOSIS — F17.219 NICOTINE DEPENDENCE, CIGARETTES, WITH UNSPECIFIED NICOTINE-INDUCED DISORDERS: ICD-10-CM

## 2023-06-19 PROCEDURE — 71271 CT THORAX LUNG CANCER SCR C-: CPT | Mod: 26

## 2023-07-06 ENCOUNTER — NON-APPOINTMENT (OUTPATIENT)
Age: 73
End: 2023-07-06

## 2023-07-12 ENCOUNTER — ASC (OUTPATIENT)
Dept: URBAN - METROPOLITAN AREA SURGERY 8 | Facility: SURGERY | Age: 73
Setting detail: OPHTHALMOLOGY
End: 2023-07-12
Payer: MEDICARE

## 2023-07-12 ENCOUNTER — OFFICE (OUTPATIENT)
Dept: URBAN - METROPOLITAN AREA CLINIC 94 | Facility: CLINIC | Age: 73
Setting detail: OPHTHALMOLOGY
End: 2023-07-12
Payer: MEDICARE

## 2023-07-12 ENCOUNTER — RX ONLY (RX ONLY)
Age: 73
End: 2023-07-12

## 2023-07-12 DIAGNOSIS — H04.123: ICD-10-CM

## 2023-07-12 DIAGNOSIS — H01.005: ICD-10-CM

## 2023-07-12 DIAGNOSIS — H26.491: ICD-10-CM

## 2023-07-12 DIAGNOSIS — H01.002: ICD-10-CM

## 2023-07-12 DIAGNOSIS — H35.373: ICD-10-CM

## 2023-07-12 PROCEDURE — 99213 OFFICE O/P EST LOW 20 MIN: CPT | Performed by: OPHTHALMOLOGY

## 2023-07-12 PROCEDURE — 92134 CPTRZ OPH DX IMG PST SGM RTA: CPT | Performed by: OPHTHALMOLOGY

## 2023-07-12 PROCEDURE — 66821 AFTER CATARACT LASER SURGERY: CPT | Performed by: OPHTHALMOLOGY

## 2023-07-12 ASSESSMENT — REFRACTION_AUTOREFRACTION
OD_SPHERE: -0.25
OS_SPHERE: +1.00
OD_CYLINDER: -0.75
OS_CYLINDER: -0.75
OD_AXIS: 088
OS_AXIS: 139

## 2023-07-12 ASSESSMENT — REFRACTION_MANIFEST
OS_AXIS: 115
OD_ADD: +2.25
OS_VA1: 20/25
OS_AXIS: 165
OD_CYLINDER: -0.75
OS_VA1: 20/20
OD_VA1: 20/20
OS_CYLINDER: -0.25
OD_CYLINDER: SPH
OD_SPHERE: -0.50
OS_VA2: 20/20
OU_VA: 20/20
OD_VA1: 20/20
OS_SPHERE: +1.75
OS_ADD: +2.25
OS_SPHERE: +0.25
OS_CYLINDER: -0.50
OD_SPHERE: -1.00
OD_VA2: 20/20
OD_AXIS: 080

## 2023-07-12 ASSESSMENT — VISUAL ACUITY
OS_BCVA: 20/25-1
OD_BCVA: 20/50

## 2023-07-12 ASSESSMENT — KERATOMETRY
OS_K1POWER_DIOPTERS: 38.00
OS_K2POWER_DIOPTERS: 38.25
OD_K1POWER_DIOPTERS: 39.50
OD_AXISANGLE_DEGREES: 010
OS_AXISANGLE_DEGREES: 030
OD_K2POWER_DIOPTERS: 40.00

## 2023-07-12 ASSESSMENT — SPHEQUIV_DERIVED
OS_SPHEQUIV: 0.625
OS_SPHEQUIV: 0
OS_SPHEQUIV: 1.625
OD_SPHEQUIV: -0.625
OD_SPHEQUIV: -1.375

## 2023-07-12 ASSESSMENT — TONOMETRY
OD_IOP_MMHG: 10
OS_IOP_MMHG: 14

## 2023-07-12 ASSESSMENT — CONFRONTATIONAL VISUAL FIELD TEST (CVF)
OD_FINDINGS: FULL
OS_FINDINGS: FULL

## 2023-07-12 ASSESSMENT — AXIALLENGTH_DERIVED
OS_AL: 25.4597
OD_AL: 25.6738
OD_AL: 25.3324
OS_AL: 25.01
OS_AL: 25.7465

## 2023-07-20 ENCOUNTER — APPOINTMENT (OUTPATIENT)
Dept: PULMONOLOGY | Facility: CLINIC | Age: 73
End: 2023-07-20
Payer: MEDICARE

## 2023-07-20 VITALS
WEIGHT: 135 LBS | HEIGHT: 60 IN | BODY MASS INDEX: 26.5 KG/M2 | RESPIRATION RATE: 16 BRPM | HEART RATE: 66 BPM | DIASTOLIC BLOOD PRESSURE: 76 MMHG | OXYGEN SATURATION: 93 % | SYSTOLIC BLOOD PRESSURE: 135 MMHG

## 2023-07-20 VITALS — OXYGEN SATURATION: 95 %

## 2023-07-20 DIAGNOSIS — J43.9 EMPHYSEMA, UNSPECIFIED: ICD-10-CM

## 2023-07-20 DIAGNOSIS — R91.8 OTHER NONSPECIFIC ABNORMAL FINDING OF LUNG FIELD: ICD-10-CM

## 2023-07-20 DIAGNOSIS — F17.219 NICOTINE DEPENDENCE, CIGARETTES, WITH UNSPECIFIED NICOTINE-INDUCED DISORDERS: ICD-10-CM

## 2023-07-20 PROCEDURE — 99213 OFFICE O/P EST LOW 20 MIN: CPT | Mod: 25

## 2023-07-20 PROCEDURE — 99406 BEHAV CHNG SMOKING 3-10 MIN: CPT

## 2023-07-20 NOTE — HISTORY OF PRESENT ILLNESS
[Current] : current [>= 20 pack years] : >= 20 pack years [TextBox_4] : hx Primary biliary cholangitis , followed by Hepatology,on ursodiol\par smoker x 40 yrs, still smoking\par has thyroid nodules, followed by PMD\par no fever, chill, chest pain\par no sig dyspnea\par \par \par

## 2023-07-20 NOTE — CONSULT LETTER
[Dear  ___] : Dear  [unfilled], [Consult Letter:] : I had the pleasure of evaluating your patient, [unfilled]. [Please see my note below.] : Please see my note below. [Sincerely,] : Sincerely, [DrAdi  ___] : Dr. VINES [FreeTextEntry3] : Wilmar Lam DO [facility]\par Pulmonary Critical Care\par Director Pulmonary Division\par Medical Director Respiratory Therapy\par [facility]\par \par

## 2023-08-02 DIAGNOSIS — E04.1 NONTOXIC SINGLE THYROID NODULE: ICD-10-CM

## 2023-08-04 ENCOUNTER — ASC (OUTPATIENT)
Dept: URBAN - METROPOLITAN AREA SURGERY 8 | Facility: SURGERY | Age: 73
Setting detail: OPHTHALMOLOGY
End: 2023-08-04
Payer: MEDICARE

## 2023-08-04 ENCOUNTER — RX ONLY (RX ONLY)
Age: 73
End: 2023-08-04

## 2023-08-04 DIAGNOSIS — H26.492: ICD-10-CM

## 2023-08-04 PROBLEM — H26.491 POSTERIOR CAPSULAR OPACIFICATION; RIGHT EYE, LEFT EYE, BOTH EYES: Status: ACTIVE | Noted: 2023-07-12

## 2023-08-04 PROBLEM — H26.493 POSTERIOR CAPSULAR OPACIFICATION; RIGHT EYE, LEFT EYE, BOTH EYES: Status: ACTIVE | Noted: 2023-07-12

## 2023-08-04 PROCEDURE — 66821 AFTER CATARACT LASER SURGERY: CPT | Performed by: OPHTHALMOLOGY

## 2023-08-04 ASSESSMENT — KERATOMETRY
OD_K2POWER_DIOPTERS: 40.25
OS_K2POWER_DIOPTERS: 38.25
OS_AXISANGLE_DEGREES: 026
OD_K1POWER_DIOPTERS: 39.75
OS_K1POWER_DIOPTERS: 37.75
OD_AXISANGLE_DEGREES: 009

## 2023-08-04 ASSESSMENT — REFRACTION_AUTOREFRACTION
OD_AXIS: 090
OS_SPHERE: +0.75
OD_SPHERE: -0.50
OS_CYLINDER: -0.50
OD_CYLINDER: -0.50
OS_AXIS: 132

## 2023-08-04 ASSESSMENT — SPHEQUIV_DERIVED
OD_SPHEQUIV: -1.375
OD_SPHEQUIV: -0.75
OS_SPHEQUIV: 1.625
OS_SPHEQUIV: 0.5
OS_SPHEQUIV: 0

## 2023-08-04 ASSESSMENT — VISUAL ACUITY
OD_BCVA: 20/30-1
OS_BCVA: 20/25

## 2023-08-04 ASSESSMENT — REFRACTION_MANIFEST
OD_VA1: 20/20
OD_SPHERE: -1.00
OD_SPHERE: -0.50
OS_CYLINDER: -0.50
OS_CYLINDER: -0.25
OD_CYLINDER: SPH
OD_ADD: +2.25
OD_VA2: 20/20
OS_ADD: +2.25
OD_VA1: 20/20
OU_VA: 20/20
OD_CYLINDER: -0.75
OS_AXIS: 115
OS_VA2: 20/20
OS_SPHERE: +0.25
OD_AXIS: 080
OS_VA1: 20/20
OS_SPHERE: +1.75
OS_VA1: 20/25
OS_AXIS: 165

## 2023-08-04 ASSESSMENT — TONOMETRY
OD_IOP_MMHG: 12
OS_IOP_MMHG: 10

## 2023-08-04 ASSESSMENT — AXIALLENGTH_DERIVED
OS_AL: 25.5704
OD_AL: 25.2828
OS_AL: 25.0656
OD_AL: 25.5656
OS_AL: 25.8013

## 2023-08-04 ASSESSMENT — CONFRONTATIONAL VISUAL FIELD TEST (CVF)
OD_FINDINGS: FULL
OS_FINDINGS: FULL

## 2023-09-22 ENCOUNTER — OFFICE (OUTPATIENT)
Dept: URBAN - METROPOLITAN AREA CLINIC 94 | Facility: CLINIC | Age: 73
Setting detail: OPHTHALMOLOGY
End: 2023-09-22
Payer: MEDICARE

## 2023-09-22 DIAGNOSIS — H26.493: ICD-10-CM

## 2023-09-22 DIAGNOSIS — Z96.1: ICD-10-CM

## 2023-09-22 PROCEDURE — 99024 POSTOP FOLLOW-UP VISIT: CPT | Performed by: OPHTHALMOLOGY

## 2023-09-22 ASSESSMENT — REFRACTION_MANIFEST
OS_VA1: 20/20
OU_VA: 20/20
OS_VA2: 20/20
OS_SPHERE: +0.25
OD_CYLINDER: SPH
OS_AXIS: 165
OS_CYLINDER: -0.50
OS_SPHERE: +0.50
OD_CYLINDER: -0.75
OS_AXIS: 130
OD_VA1: 20/20
OS_CYLINDER: -0.25
OD_SPHERE: -0.50
OD_VA1: 20/20
OD_AXIS: 080
OS_VA1: 20/25-2
OD_VA2: 20/20
OD_SPHERE: -0.50

## 2023-09-22 ASSESSMENT — REFRACTION_AUTOREFRACTION
OD_CYLINDER: -1.00
OS_AXIS: 129
OD_AXIS: 077
OD_SPHERE: -0.50
OS_CYLINDER: -0.50
OS_SPHERE: +0.50

## 2023-09-22 ASSESSMENT — KERATOMETRY
OD_K2POWER_DIOPTERS: 40.25
OS_K1POWER_DIOPTERS: 37.75
OD_K1POWER_DIOPTERS: 39.75
OD_AXISANGLE_DEGREES: 164
OS_AXISANGLE_DEGREES: 043
OS_K2POWER_DIOPTERS: 38.25

## 2023-09-22 ASSESSMENT — AXIALLENGTH_DERIVED
OS_AL: 25.6853
OS_AL: 25.8013
OD_AL: 25.3388
OD_AL: 25.3951
OS_AL: 25.6277

## 2023-09-22 ASSESSMENT — VISUAL ACUITY
OS_BCVA: 20/20-1
OD_BCVA: 20/30-1

## 2023-09-22 ASSESSMENT — TONOMETRY
OS_IOP_MMHG: 12
OD_IOP_MMHG: 17

## 2023-09-22 ASSESSMENT — SPHEQUIV_DERIVED
OS_SPHEQUIV: 0.25
OS_SPHEQUIV: 0
OD_SPHEQUIV: -0.875
OD_SPHEQUIV: -1
OS_SPHEQUIV: 0.375

## 2023-09-22 ASSESSMENT — CONFRONTATIONAL VISUAL FIELD TEST (CVF)
OD_FINDINGS: FULL
OS_FINDINGS: FULL

## 2023-10-05 ENCOUNTER — APPOINTMENT (OUTPATIENT)
Dept: HEPATOLOGY | Facility: CLINIC | Age: 73
End: 2023-10-05
Payer: MEDICARE

## 2023-10-05 VITALS
OXYGEN SATURATION: 98 % | SYSTOLIC BLOOD PRESSURE: 148 MMHG | HEIGHT: 60 IN | DIASTOLIC BLOOD PRESSURE: 80 MMHG | WEIGHT: 135 LBS | HEART RATE: 75 BPM | BODY MASS INDEX: 26.5 KG/M2

## 2023-10-05 DIAGNOSIS — R79.89 OTHER SPECIFIED ABNORMAL FINDINGS OF BLOOD CHEMISTRY: ICD-10-CM

## 2023-10-05 DIAGNOSIS — E78.5 HYPERLIPIDEMIA, UNSPECIFIED: ICD-10-CM

## 2023-10-05 PROCEDURE — 99214 OFFICE O/P EST MOD 30 MIN: CPT

## 2023-10-05 RX ORDER — URSODIOL 250 MG/1
250 TABLET ORAL 3 TIMES DAILY
Qty: 270 | Refills: 3 | Status: ACTIVE | COMMUNITY
Start: 1900-01-01 | End: 1900-01-01

## 2023-11-03 ENCOUNTER — OFFICE (OUTPATIENT)
Dept: URBAN - METROPOLITAN AREA CLINIC 94 | Facility: CLINIC | Age: 73
Setting detail: OPHTHALMOLOGY
End: 2023-11-03
Payer: MEDICARE

## 2023-11-03 DIAGNOSIS — H04.123: ICD-10-CM

## 2023-11-03 PROCEDURE — 99212 OFFICE O/P EST SF 10 MIN: CPT | Performed by: OPHTHALMOLOGY

## 2023-11-03 ASSESSMENT — REFRACTION_MANIFEST
OS_CYLINDER: -0.25
OD_CYLINDER: SPH
OD_CYLINDER: -0.75
OS_AXIS: 130
OD_VA1: 20/20
OS_VA1: 20/25-2
OS_VA2: 20/20
OD_SPHERE: -0.50
OD_VA2: 20/20
OD_AXIS: 080
OU_VA: 20/20
OD_SPHERE: -0.50
OS_CYLINDER: -0.50
OS_VA1: 20/20
OS_AXIS: 165
OS_SPHERE: +0.50
OD_VA1: 20/20
OS_SPHERE: +0.25

## 2023-11-03 ASSESSMENT — SPHEQUIV_DERIVED
OD_SPHEQUIV: -0.875
OD_SPHEQUIV: -0.875
OS_SPHEQUIV: 0.375
OS_SPHEQUIV: 0
OS_SPHEQUIV: 0

## 2023-11-03 ASSESSMENT — REFRACTION_AUTOREFRACTION
OD_AXIS: 083
OS_CYLINDER: -1.00
OD_CYLINDER: -1.25
OS_AXIS: 127
OS_SPHERE: +0.50
OD_SPHERE: -0.25

## 2023-11-03 ASSESSMENT — CONFRONTATIONAL VISUAL FIELD TEST (CVF)
OD_FINDINGS: FULL
OS_FINDINGS: FULL

## 2023-11-10 ENCOUNTER — TRANSCRIPTION ENCOUNTER (OUTPATIENT)
Age: 73
End: 2023-11-10

## 2023-11-10 ENCOUNTER — NON-APPOINTMENT (OUTPATIENT)
Age: 73
End: 2023-11-10

## 2024-04-20 ENCOUNTER — NON-APPOINTMENT (OUTPATIENT)
Age: 74
End: 2024-04-20

## 2024-05-01 ENCOUNTER — OUTPATIENT (OUTPATIENT)
Dept: OUTPATIENT SERVICES | Facility: HOSPITAL | Age: 74
LOS: 1 days | End: 2024-05-01
Payer: MEDICARE

## 2024-05-01 ENCOUNTER — APPOINTMENT (OUTPATIENT)
Dept: MRI IMAGING | Facility: CLINIC | Age: 74
End: 2024-05-01
Payer: MEDICARE

## 2024-05-01 ENCOUNTER — RESULT REVIEW (OUTPATIENT)
Age: 74
End: 2024-05-01

## 2024-05-01 DIAGNOSIS — K74.3 PRIMARY BILIARY CIRRHOSIS: ICD-10-CM

## 2024-05-01 PROCEDURE — 74183 MRI ABD W/O CNTR FLWD CNTR: CPT | Mod: MH

## 2024-05-01 PROCEDURE — 74183 MRI ABD W/O CNTR FLWD CNTR: CPT | Mod: 26,MH

## 2024-05-01 PROCEDURE — 76391 MR ELASTOGRAPHY: CPT

## 2024-05-01 PROCEDURE — 76391 MR ELASTOGRAPHY: CPT | Mod: 26

## 2024-05-01 PROCEDURE — A9585: CPT

## 2024-05-16 ENCOUNTER — APPOINTMENT (OUTPATIENT)
Dept: HEPATOLOGY | Facility: CLINIC | Age: 74
End: 2024-05-16
Payer: MEDICARE

## 2024-05-16 VITALS
HEIGHT: 60 IN | TEMPERATURE: 98.3 F | OXYGEN SATURATION: 97 % | HEART RATE: 88 BPM | DIASTOLIC BLOOD PRESSURE: 84 MMHG | BODY MASS INDEX: 26.5 KG/M2 | WEIGHT: 135 LBS | SYSTOLIC BLOOD PRESSURE: 154 MMHG | RESPIRATION RATE: 16 BRPM

## 2024-05-16 PROCEDURE — 99214 OFFICE O/P EST MOD 30 MIN: CPT

## 2024-05-31 ENCOUNTER — OUTPATIENT (OUTPATIENT)
Dept: OUTPATIENT SERVICES | Facility: HOSPITAL | Age: 74
LOS: 1 days | End: 2024-05-31
Payer: MEDICARE

## 2024-05-31 VITALS
WEIGHT: 143.08 LBS | RESPIRATION RATE: 18 BRPM | TEMPERATURE: 96 F | SYSTOLIC BLOOD PRESSURE: 130 MMHG | OXYGEN SATURATION: 96 % | HEART RATE: 95 BPM | DIASTOLIC BLOOD PRESSURE: 80 MMHG | HEIGHT: 60 IN

## 2024-05-31 DIAGNOSIS — K74.3 PRIMARY BILIARY CIRRHOSIS: ICD-10-CM

## 2024-05-31 DIAGNOSIS — Z90.49 ACQUIRED ABSENCE OF OTHER SPECIFIED PARTS OF DIGESTIVE TRACT: Chronic | ICD-10-CM

## 2024-05-31 DIAGNOSIS — Z98.49 CATARACT EXTRACTION STATUS, UNSPECIFIED EYE: Chronic | ICD-10-CM

## 2024-05-31 DIAGNOSIS — Z90.89 ACQUIRED ABSENCE OF OTHER ORGANS: Chronic | ICD-10-CM

## 2024-05-31 DIAGNOSIS — Z01.818 ENCOUNTER FOR OTHER PREPROCEDURAL EXAMINATION: ICD-10-CM

## 2024-05-31 DIAGNOSIS — Z91.89 OTHER SPECIFIED PERSONAL RISK FACTORS, NOT ELSEWHERE CLASSIFIED: ICD-10-CM

## 2024-05-31 DIAGNOSIS — Z86.79 PERSONAL HISTORY OF OTHER DISEASES OF THE CIRCULATORY SYSTEM: ICD-10-CM

## 2024-05-31 DIAGNOSIS — J43.9 EMPHYSEMA, UNSPECIFIED: ICD-10-CM

## 2024-05-31 DIAGNOSIS — I10 ESSENTIAL (PRIMARY) HYPERTENSION: ICD-10-CM

## 2024-05-31 DIAGNOSIS — Z90.710 ACQUIRED ABSENCE OF BOTH CERVIX AND UTERUS: Chronic | ICD-10-CM

## 2024-05-31 LAB
A1C WITH ESTIMATED AVERAGE GLUCOSE RESULT: 5.4 % — SIGNIFICANT CHANGE UP (ref 4–5.6)
ALBUMIN SERPL ELPH-MCNC: 4.2 G/DL — SIGNIFICANT CHANGE UP (ref 3.3–5.2)
ALP SERPL-CCNC: 89 U/L — SIGNIFICANT CHANGE UP (ref 40–120)
ALT FLD-CCNC: 26 U/L — SIGNIFICANT CHANGE UP
ANION GAP SERPL CALC-SCNC: 8 MMOL/L — SIGNIFICANT CHANGE UP (ref 5–17)
APTT BLD: 31 SEC — SIGNIFICANT CHANGE UP (ref 24.5–35.6)
AST SERPL-CCNC: 22 U/L — SIGNIFICANT CHANGE UP
BASOPHILS # BLD AUTO: 0.01 K/UL — SIGNIFICANT CHANGE UP (ref 0–0.2)
BASOPHILS NFR BLD AUTO: 0.2 % — SIGNIFICANT CHANGE UP (ref 0–2)
BILIRUB SERPL-MCNC: 0.6 MG/DL — SIGNIFICANT CHANGE UP (ref 0.4–2)
BUN SERPL-MCNC: 9.3 MG/DL — SIGNIFICANT CHANGE UP (ref 8–20)
CALCIUM SERPL-MCNC: 9 MG/DL — SIGNIFICANT CHANGE UP (ref 8.4–10.5)
CHLORIDE SERPL-SCNC: 103 MMOL/L — SIGNIFICANT CHANGE UP (ref 96–108)
CO2 SERPL-SCNC: 28 MMOL/L — SIGNIFICANT CHANGE UP (ref 22–29)
CREAT SERPL-MCNC: 0.57 MG/DL — SIGNIFICANT CHANGE UP (ref 0.5–1.3)
EGFR: 95 ML/MIN/1.73M2 — SIGNIFICANT CHANGE UP
EOSINOPHIL # BLD AUTO: 0.16 K/UL — SIGNIFICANT CHANGE UP (ref 0–0.5)
EOSINOPHIL NFR BLD AUTO: 3.7 % — SIGNIFICANT CHANGE UP (ref 0–6)
ESTIMATED AVERAGE GLUCOSE: 108 MG/DL — SIGNIFICANT CHANGE UP (ref 68–114)
GLUCOSE SERPL-MCNC: 100 MG/DL — HIGH (ref 70–99)
HCT VFR BLD CALC: 41.5 % — SIGNIFICANT CHANGE UP (ref 34.5–45)
HGB BLD-MCNC: 14.9 G/DL — SIGNIFICANT CHANGE UP (ref 11.5–15.5)
IMM GRANULOCYTES NFR BLD AUTO: 0.2 % — SIGNIFICANT CHANGE UP (ref 0–0.9)
INR BLD: 0.9 RATIO — SIGNIFICANT CHANGE UP (ref 0.85–1.18)
LYMPHOCYTES # BLD AUTO: 1.36 K/UL — SIGNIFICANT CHANGE UP (ref 1–3.3)
LYMPHOCYTES # BLD AUTO: 31.4 % — SIGNIFICANT CHANGE UP (ref 13–44)
MCHC RBC-ENTMCNC: 32.1 PG — SIGNIFICANT CHANGE UP (ref 27–34)
MCHC RBC-ENTMCNC: 35.9 GM/DL — SIGNIFICANT CHANGE UP (ref 32–36)
MCV RBC AUTO: 89.4 FL — SIGNIFICANT CHANGE UP (ref 80–100)
MONOCYTES # BLD AUTO: 0.2 K/UL — SIGNIFICANT CHANGE UP (ref 0–0.9)
MONOCYTES NFR BLD AUTO: 4.6 % — SIGNIFICANT CHANGE UP (ref 2–14)
NEUTROPHILS # BLD AUTO: 2.59 K/UL — SIGNIFICANT CHANGE UP (ref 1.8–7.4)
NEUTROPHILS NFR BLD AUTO: 59.9 % — SIGNIFICANT CHANGE UP (ref 43–77)
PLATELET # BLD AUTO: 185 K/UL — SIGNIFICANT CHANGE UP (ref 150–400)
POTASSIUM SERPL-MCNC: 4.9 MMOL/L — SIGNIFICANT CHANGE UP (ref 3.5–5.3)
POTASSIUM SERPL-SCNC: 4.9 MMOL/L — SIGNIFICANT CHANGE UP (ref 3.5–5.3)
PROT SERPL-MCNC: 6.9 G/DL — SIGNIFICANT CHANGE UP (ref 6.6–8.7)
PROTHROM AB SERPL-ACNC: 10 SEC — SIGNIFICANT CHANGE UP (ref 9.5–13)
RBC # BLD: 4.64 M/UL — SIGNIFICANT CHANGE UP (ref 3.8–5.2)
RBC # FLD: 12.9 % — SIGNIFICANT CHANGE UP (ref 10.3–14.5)
SODIUM SERPL-SCNC: 139 MMOL/L — SIGNIFICANT CHANGE UP (ref 135–145)
WBC # BLD: 4.33 K/UL — SIGNIFICANT CHANGE UP (ref 3.8–10.5)
WBC # FLD AUTO: 4.33 K/UL — SIGNIFICANT CHANGE UP (ref 3.8–10.5)

## 2024-05-31 PROCEDURE — 36415 COLL VENOUS BLD VENIPUNCTURE: CPT

## 2024-05-31 PROCEDURE — 80053 COMPREHEN METABOLIC PANEL: CPT

## 2024-05-31 PROCEDURE — 85730 THROMBOPLASTIN TIME PARTIAL: CPT

## 2024-05-31 PROCEDURE — G0463: CPT

## 2024-05-31 PROCEDURE — 85610 PROTHROMBIN TIME: CPT

## 2024-05-31 PROCEDURE — 85025 COMPLETE CBC W/AUTO DIFF WBC: CPT

## 2024-05-31 PROCEDURE — 93005 ELECTROCARDIOGRAM TRACING: CPT

## 2024-05-31 PROCEDURE — 93010 ELECTROCARDIOGRAM REPORT: CPT

## 2024-05-31 PROCEDURE — 83036 HEMOGLOBIN GLYCOSYLATED A1C: CPT

## 2024-05-31 NOTE — H&P PST ADULT - PROBLEM SELECTOR PLAN 5
Medical optimization pending  Advised ot use inhaler the morning of the procedure and bring the day of procedure

## 2024-05-31 NOTE — H&P PST ADULT - HISTORY OF PRESENT ILLNESS
72 y/o female with PMH of     Patient is scheduled for CT guided liver biopsy with anesthesia on 6/10/24, ordered by Dr. Marisa Roldan, Dr. Iglesias performing case.     73 year White female with past medical hx is significant for primary biliary cholangitis documented on liver biopsy performed in April of 2015. The patient has potential for overlap syndrome with autoimmune hepatitis based on these results. However aminotransferase values normalized on ursodiol. She remains on Ursodiol 750 mg per day. No cholestatic symptoms. She is also taking Pravastatin for HLD, this was briefly switched to Atorvastatin which caused elevated LFTs. She is now back on Pravastatin. Her most recent MRE from 5/2024 is showing Fibrosis 3-4. 74 y/o female with PMH of HTN, tachycardia, Thyroid nodules, 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 States she has been monitored by the hepatolgist due to her primary biliary cholangitis and has been found to possibly have liver cirrohisis.     She has been taking ursodiol medication which has helped to maintain normal aminotransferase values. MRE done on 5/2024 is showing Fibrosis 3-4.  Denies fevers, chills, nausea or vomiting. Patient is scheduled for CT guided liver biopsy with anesthesia on 6/10/24, ordered by Dr. Marisa Roldan, Dr. Iglesias performing case.  74 y/o female with PMH of HTN, tachycardia, Thyroid nodules, 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 States she has been monitored by the hepatologist due to her primary biliary cholangitis and has been found to possibly have liver cirrhosis. She has been taking ursodiol medication which has helped to maintain normal aminotransferase values. MRE done on 5/2024 is showing Fibrosis 3-4. Denies fevers, chills, nausea or vomiting. Patient is scheduled for CT guided liver biopsy with anesthesia on 6/10/24, ordered by Dr. Marisa Roldan, Dr. Iglesias performing case.

## 2024-05-31 NOTE — H&P PST ADULT - NSICDXFAMILYHX_GEN_ALL_CORE_FT
FAMILY HISTORY:  Father  Still living? Unknown  FH: lung cancer, Age at diagnosis: Age Unknown    Mother  Still living? Unknown  FH: CAD (coronary artery disease), Age at diagnosis: Age Unknown  FH: colon cancer, Age at diagnosis: Age Unknown

## 2024-05-31 NOTE — H&P PST ADULT - PRIMARY CARE PROVIDER
Dr. Marisa Roldan ordering, Dr. Iglesias performing case Marisa Roldan FNP ordering, Dr. Iglesias performing case

## 2024-05-31 NOTE — H&P PST ADULT - PROBLEM SELECTOR PLAN 1
medical clearance, cardiac clearance pending medical clearance, cardiac clearance pending  Patient is scheduled for CT guided liver biopsy with anesthesia on 6/10/24, ordered by Dr. Marisa Roldan, Dr. Iglesias performing case.

## 2024-05-31 NOTE — H&P PST ADULT - NSICDXPASTMEDICALHX_GEN_ALL_CORE_FT
PAST MEDICAL HISTORY:  HTN (hypertension)      PAST MEDICAL HISTORY:  Emphysema/COPD     H/O sinus tachycardia     HTN (hypertension)     Primary biliary cholangitis     Thyroid nodule

## 2024-05-31 NOTE — H&P PST ADULT - ASSESSMENT
Patient educated on surgical scrub, preadmission instructions, medical clearance and day of procedure medications, verbalizes understanding. Pt instructed to stop vitamins/supplements/herbal medications/ASA/NSAIDS for one week prior to surgery and discuss with PMD.    CAPRINI SCORE    AGE RELATED RISK FACTORS                                                             [ ] Age 41-60 years                                            (1 Point)  [ ] Age: 61-74 years                                           (2 Points)                 [ ] Age= 75 years                                                (3 Points)             DISEASE RELATED RISK FACTORS                                                       [ ] Edema in the lower extremities                 (1 Point)                     [ ] Varicose veins                                               (1 Point)                                 [ ] BMI > 25 Kg/m2                                            (1 Point)                                  [ ] Serious infection (ie PNA)                            (1 Point)                     [ ] Lung disease ( COPD, Emphysema)            (1 Point)                                                                          [ ] Acute myocardial infarction                         (1 Point)                  [ ] Congestive heart failure (in the previous month)  (1 Point)         [ ] Inflammatory bowel disease                            (1 Point)                  [ ] Central venous access, PICC or Port               (2 points)       (within the last month)                                                                [ ] Stroke (in the previous month)                        (5 Points)    [ ] Previous or present malignancy                       (2 points)                                                                                                                                                         HEMATOLOGY RELATED FACTORS                                                         [ ] Prior episodes of VTE                                     (3 Points)                     [ ] Positive family history for VTE                      (3 Points)                  [ ] Prothrombin 34471 A                                     (3 Points)                     [ ] Factor V Leiden                                                (3 Points)                        [ ] Lupus anticoagulants                                      (3 Points)                                                           [ ] Anticardiolipin antibodies                              (3 Points)                                                       [ ] High homocysteine in the blood                   (3 Points)                                             [ ] Other congenital or acquired thrombophilia      (3 Points)                                                [ ] Heparin induced thrombocytopenia                  (3 Points)                                        MOBILITY RELATED FACTORS  [ ] Bed rest                                                         (1 Point)  [ ] Plaster cast                                                    (2 points)  [ ] Bed bound for more than 72 hours           (2 Points)    GENDER SPECIFIC FACTORS  [ ] Pregnancy or had a baby within the last month   (1 Point)  [ ] Post-partum < 6 weeks                                   (1 Point)  [ ] Hormonal therapy  or oral contraception   (1 Point)  [ ] History of pregnancy complications              (1 point)  [ ] Unexplained or recurrent              (1 Point)    OTHER RISK FACTORS                                           (1 Point)  [ ] BMI >40, smoking, diabetes requiring insulin, chemotherapy  blood transfusions and length of surgery over 2 hours    SURGERY RELATED RISK FACTORS  [ ]  Section within the last month     (1 Point)  [ ] Minor surgery                                                  (1 Point)  [ ] Arthroscopic surgery                                       (2 Points)  [ ] Planned major surgery lasting more            (2 Points)      than 45 minutes     [ ] Elective hip or knee joint replacement       (5 points)       surgery                                                TRAUMA RELATED RISK FACTORS  [ ] Fracture of the hip, pelvis, or leg                       (5 Points)  [ ] Spinal cord injury resulting in paralysis             (5 points)       (in the previous month)    [ ] Paralysis  (less than 1 month)                             (5 Points)  [ ] Multiple Trauma within 1 month                        (5 Points)    Total Score [        ]    Caprini Score 0-2: Low Risk, NO VTE prophylaxis required for most patients, encourage ambulation  Caprini Score 3-6: Moderate Risk , pharmacologic VTE prophylaxis is indicated for most patients (in the absence of contraindications)  Caprini Score Greater than or =7: High risk, pharmocologic VTE prophylaxis indicated for most patients (in the absence of contraindications)    OPIOID RISK TOOL    ROBI EACH BOX THAT APPLIES AND ADD TOTALS AT THE END    FAMILY HISTORY OF SUBSTANCE ABUSE                 FEMALE         MALE                                                Alcohol                             [  ]1 pt          [  ]3pts                                               Illegal Durgs                     [  ]2 pts        [  ]3pts                                               Rx Drugs                           [  ]4 pts        [  ]4 pts    PERSONAL HISTORY OF SUBSTANCE ABUSE                                                                                          Alcohol                             [  ]3 pts       [  ]3 pts                                               Illegal Drugs                     [  ]4 pts        [  ]4 pts                                               Rx Drugs                           [  ]5 pts        [  ]5 pts    AGE BETWEEN 16-45 YEARS                                      [  ]1 pt         [  ]1 pt    HISTORY OF PREADOLESCENT   SEXUAL ABUSE                                                             [  ]3 pts        [  ]0pts    PSYCHOLOGICAL DISEASE                     ADD, OCD, Bipolar, Schizophrenia        [  ]2 pts         [  ]2 pts                      Depression                                               [  ]1 pt           [  ]1 pt           SCORING TOTAL   (add numbers and type here)              (***)                                     A score of 3 or lower indicated LOW risk for future opioid abuse  A score of 4 to 7 indicated moderate risk for future opioid abuse  A score of 8 or higher indicates a high risk for opioid abuse     Patient educated on surgical scrub, preadmission instructions, medical clearance, cardiac clearance and day of procedure medications, verbalizes understanding. Pt instructed to stop vitamins/supplements/herbal medications/NSAIDS for one week prior to surgery and discuss with PMD. Asked the patient to consult with PCP about holding ASA and the pt agreed.     CAPRINI SCORE    AGE RELATED RISK FACTORS                                                             [ ] Age 41-60 years                                            (1 Point)  [ ] Age: 61-74 years                                           (2 Points)                 [ ] Age= 75 years                                                (3 Points)             DISEASE RELATED RISK FACTORS                                                       [ ] Edema in the lower extremities                 (1 Point)                     [ ] Varicose veins                                               (1 Point)                                 [ ] BMI > 25 Kg/m2                                            (1 Point)                                  [ ] Serious infection (ie PNA)                            (1 Point)                     [ ] Lung disease ( COPD, Emphysema)            (1 Point)                                                                          [ ] Acute myocardial infarction                         (1 Point)                  [ ] Congestive heart failure (in the previous month)  (1 Point)         [ ] Inflammatory bowel disease                            (1 Point)                  [ ] Central venous access, PICC or Port               (2 points)       (within the last month)                                                                [ ] Stroke (in the previous month)                        (5 Points)    [ ] Previous or present malignancy                       (2 points)                                                                                                                                                         HEMATOLOGY RELATED FACTORS                                                         [ ] Prior episodes of VTE                                     (3 Points)                     [ ] Positive family history for VTE                      (3 Points)                  [ ] Prothrombin 37725 A                                     (3 Points)                     [ ] Factor V Leiden                                                (3 Points)                        [ ] Lupus anticoagulants                                      (3 Points)                                                           [ ] Anticardiolipin antibodies                              (3 Points)                                                       [ ] High homocysteine in the blood                   (3 Points)                                             [ ] Other congenital or acquired thrombophilia      (3 Points)                                                [ ] Heparin induced thrombocytopenia                  (3 Points)                                        MOBILITY RELATED FACTORS  [ ] Bed rest                                                         (1 Point)  [ ] Plaster cast                                                    (2 points)  [ ] Bed bound for more than 72 hours           (2 Points)    GENDER SPECIFIC FACTORS  [ ] Pregnancy or had a baby within the last month   (1 Point)  [ ] Post-partum < 6 weeks                                   (1 Point)  [ ] Hormonal therapy  or oral contraception   (1 Point)  [ ] History of pregnancy complications              (1 point)  [ ] Unexplained or recurrent              (1 Point)    OTHER RISK FACTORS                                           (1 Point)  [ ] BMI >40, smoking, diabetes requiring insulin, chemotherapy  blood transfusions and length of surgery over 2 hours    SURGERY RELATED RISK FACTORS  [ ]  Section within the last month     (1 Point)  [ ] Minor surgery                                                  (1 Point)  [ ] Arthroscopic surgery                                       (2 Points)  [ ] Planned major surgery lasting more            (2 Points)      than 45 minutes     [ ] Elective hip or knee joint replacement       (5 points)       surgery                                                TRAUMA RELATED RISK FACTORS  [ ] Fracture of the hip, pelvis, or leg                       (5 Points)  [ ] Spinal cord injury resulting in paralysis             (5 points)       (in the previous month)    [ ] Paralysis  (less than 1 month)                             (5 Points)  [ ] Multiple Trauma within 1 month                        (5 Points)    Total Score [        ]    Caprini Score 0-2: Low Risk, NO VTE prophylaxis required for most patients, encourage ambulation  Caprini Score 3-6: Moderate Risk , pharmacologic VTE prophylaxis is indicated for most patients (in the absence of contraindications)  Caprini Score Greater than or =7: High risk, pharmocologic VTE prophylaxis indicated for most patients (in the absence of contraindications)    OPIOID RISK TOOL    ROBI EACH BOX THAT APPLIES AND ADD TOTALS AT THE END    FAMILY HISTORY OF SUBSTANCE ABUSE                 FEMALE         MALE                                                Alcohol                             [  ]1 pt          [  ]3pts                                               Illegal Durgs                     [  ]2 pts        [  ]3pts                                               Rx Drugs                           [  ]4 pts        [  ]4 pts    PERSONAL HISTORY OF SUBSTANCE ABUSE                                                                                          Alcohol                             [  ]3 pts       [  ]3 pts                                               Illegal Drugs                     [  ]4 pts        [  ]4 pts                                               Rx Drugs                           [  ]5 pts        [  ]5 pts    AGE BETWEEN 16-45 YEARS                                      [  ]1 pt         [  ]1 pt    HISTORY OF PREADOLESCENT   SEXUAL ABUSE                                                             [  ]3 pts        [  ]0pts    PSYCHOLOGICAL DISEASE                     ADD, OCD, Bipolar, Schizophrenia        [  ]2 pts         [  ]2 pts                      Depression                                               [  ]1 pt           [  ]1 pt           SCORING TOTAL   (add numbers and type here)              (***)                                     A score of 3 or lower indicated LOW risk for future opioid abuse  A score of 4 to 7 indicated moderate risk for future opioid abuse  A score of 8 or higher indicates a high risk for opioid abuse   72 y/o female with PMH of HTN, tachycardia, Thyroid nodules, 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 States she has been monitored by the hepatologist due to her primary biliary cholangitis and has been found to possibly have liver cirrhosis. She has been taking ursodiol medication which has helped to maintain normal aminotransferase values. MRE done on 2024 is showing Fibrosis 3-4. Denies fevers, chills, nausea or vomiting. Patient is scheduled for CT guided liver biopsy with anesthesia on 6/10/24, ordered by Dr. Marisa Roldan, Dr. Iglesias performing case. Patient educated on surgical scrub, preadmission instructions, medical clearance, cardiac clearance and day of procedure medications, verbalizes understanding. Pt instructed to stop vitamins/supplements/herbal medications/NSAIDS for one week prior to surgery and discuss with PMD. Asked the patient to consult with PCP about holding ASA and the pt agreed.     CAPRINI SCORE    AGE RELATED RISK FACTORS                                                             [ ] Age 41-60 years                                            (1 Point)  [x ] Age: 61-74 years                                           (2 Points)                 [ ] Age= 75 years                                                (3 Points)             DISEASE RELATED RISK FACTORS                                                       [ ] Edema in the lower extremities                 (1 Point)                     [ ] Varicose veins                                               (1 Point)                                 [x ] BMI > 25 Kg/m2                                            (1 Point)                                  [ ] Serious infection (ie PNA)                            (1 Point)                     [ ] Lung disease ( COPD, Emphysema)            (1 Point)                                                                          [ ] Acute myocardial infarction                         (1 Point)                  [ ] Congestive heart failure (in the previous month)  (1 Point)         [ ] Inflammatory bowel disease                            (1 Point)                  [ ] Central venous access, PICC or Port               (2 points)       (within the last month)                                                                [ ] Stroke (in the previous month)                        (5 Points)    [ ] Previous or present malignancy                       (2 points)                                                                                                                                                         HEMATOLOGY RELATED FACTORS                                                         [ ] Prior episodes of VTE                                     (3 Points)                     [ ] Positive family history for VTE                      (3 Points)                  [ ] Prothrombin 77744 A                                     (3 Points)                     [ ] Factor V Leiden                                                (3 Points)                        [ ] Lupus anticoagulants                                      (3 Points)                                                           [ ] Anticardiolipin antibodies                              (3 Points)                                                       [ ] High homocysteine in the blood                   (3 Points)                                             [ ] Other congenital or acquired thrombophilia      (3 Points)                                                [ ] Heparin induced thrombocytopenia                  (3 Points)                                        MOBILITY RELATED FACTORS  [ ] Bed rest                                                         (1 Point)  [ ] Plaster cast                                                    (2 points)  [ ] Bed bound for more than 72 hours           (2 Points)    GENDER SPECIFIC FACTORS  [ ] Pregnancy or had a baby within the last month   (1 Point)  [ ] Post-partum < 6 weeks                                   (1 Point)  [ ] Hormonal therapy  or oral contraception   (1 Point)  [ ] History of pregnancy complications              (1 point)  [ ] Unexplained or recurrent              (1 Point)    OTHER RISK FACTORS                                           (1 Point)  [x ] BMI >40, smoking, diabetes requiring insulin, chemotherapy  blood transfusions and length of surgery over 2 hours    SURGERY RELATED RISK FACTORS  [ ]  Section within the last month     (1 Point)  [x ] Minor surgery                                                  (1 Point)  [ ] Arthroscopic surgery                                       (2 Points)  [ ] Planned major surgery lasting more            (2 Points)      than 45 minutes     [ ] Elective hip or knee joint replacement       (5 points)       surgery                                                TRAUMA RELATED RISK FACTORS  [ ] Fracture of the hip, pelvis, or leg                       (5 Points)  [ ] Spinal cord injury resulting in paralysis             (5 points)       (in the previous month)    [ ] Paralysis  (less than 1 month)                             (5 Points)  [ ] Multiple Trauma within 1 month                        (5 Points)    Total Score [  5      ]    Caprini Score 0-2: Low Risk, NO VTE prophylaxis required for most patients, encourage ambulation  Caprini Score 3-6: Moderate Risk , pharmacologic VTE prophylaxis is indicated for most patients (in the absence of contraindications)  Caprini Score Greater than or =7: High risk, pharmocologic VTE prophylaxis indicated for most patients (in the absence of contraindications)    OPIOID RISK TOOL    ROBI EACH BOX THAT APPLIES AND ADD TOTALS AT THE END    FAMILY HISTORY OF SUBSTANCE ABUSE                 FEMALE         MALE                                                Alcohol                             [  ]1 pt          [  ]3pts                                               Illegal Durgs                     [  ]2 pts        [  ]3pts                                               Rx Drugs                           [  ]4 pts        [  ]4 pts    PERSONAL HISTORY OF SUBSTANCE ABUSE                                                                                          Alcohol                             [  ]3 pts       [  ]3 pts                                               Illegal Drugs                     [  ]4 pts        [  ]4 pts                                               Rx Drugs                           [  ]5 pts        [  ]5 pts    AGE BETWEEN 16-45 YEARS                                      [  ]1 pt         [  ]1 pt    HISTORY OF PREADOLESCENT   SEXUAL ABUSE                                                             [  ]3 pts        [  ]0pts    PSYCHOLOGICAL DISEASE                     ADD, OCD, Bipolar, Schizophrenia        [  ]2 pts         [  ]2 pts                      Depression                                               [  ]1 pt           [  ]1 pt           SCORING TOTAL   (add numbers and type here)              (**0*)                                     A score of 3 or lower indicated LOW risk for future opioid abuse  A score of 4 to 7 indicated moderate risk for future opioid abuse  A score of 8 or higher indicates a high risk for opioid abuse

## 2024-05-31 NOTE — H&P PST ADULT - NSICDXPASTSURGICALHX_GEN_ALL_CORE_FT
PAST SURGICAL HISTORY:  H/O cataract extraction     H/O: hysterectomy     History of cholecystectomy     History of tonsillectomy

## 2024-06-04 PROBLEM — K74.3 PRIMARY BILIARY CIRRHOSIS: Chronic | Status: ACTIVE | Noted: 2024-05-31

## 2024-06-04 PROBLEM — E04.1 NONTOXIC SINGLE THYROID NODULE: Chronic | Status: ACTIVE | Noted: 2024-05-31

## 2024-06-04 PROBLEM — Z86.79 PERSONAL HISTORY OF OTHER DISEASES OF THE CIRCULATORY SYSTEM: Chronic | Status: ACTIVE | Noted: 2024-05-31

## 2024-06-04 PROBLEM — J43.9 EMPHYSEMA, UNSPECIFIED: Chronic | Status: ACTIVE | Noted: 2024-05-31

## 2024-06-04 PROBLEM — I10 ESSENTIAL (PRIMARY) HYPERTENSION: Chronic | Status: ACTIVE | Noted: 2024-05-31

## 2024-06-10 ENCOUNTER — OUTPATIENT (OUTPATIENT)
Dept: OUTPATIENT SERVICES | Facility: HOSPITAL | Age: 74
LOS: 1 days | End: 2024-06-10
Payer: MEDICARE

## 2024-06-10 ENCOUNTER — TRANSCRIPTION ENCOUNTER (OUTPATIENT)
Age: 74
End: 2024-06-10

## 2024-06-10 VITALS
RESPIRATION RATE: 16 BRPM | HEIGHT: 60 IN | OXYGEN SATURATION: 99 % | HEART RATE: 63 BPM | TEMPERATURE: 98 F | DIASTOLIC BLOOD PRESSURE: 72 MMHG | WEIGHT: 138.01 LBS | SYSTOLIC BLOOD PRESSURE: 155 MMHG

## 2024-06-10 DIAGNOSIS — Z90.89 ACQUIRED ABSENCE OF OTHER ORGANS: Chronic | ICD-10-CM

## 2024-06-10 DIAGNOSIS — Z90.49 ACQUIRED ABSENCE OF OTHER SPECIFIED PARTS OF DIGESTIVE TRACT: Chronic | ICD-10-CM

## 2024-06-10 DIAGNOSIS — Z90.710 ACQUIRED ABSENCE OF BOTH CERVIX AND UTERUS: Chronic | ICD-10-CM

## 2024-06-10 DIAGNOSIS — Z98.49 CATARACT EXTRACTION STATUS, UNSPECIFIED EYE: Chronic | ICD-10-CM

## 2024-06-10 DIAGNOSIS — K74.3 PRIMARY BILIARY CIRRHOSIS: ICD-10-CM

## 2024-06-10 PROCEDURE — 88307 TISSUE EXAM BY PATHOLOGIST: CPT

## 2024-06-10 PROCEDURE — 88313 SPECIAL STAINS GROUP 2: CPT

## 2024-06-10 PROCEDURE — 88313 SPECIAL STAINS GROUP 2: CPT | Mod: 26

## 2024-06-10 PROCEDURE — 47000 NEEDLE BIOPSY OF LIVER PERQ: CPT

## 2024-06-10 PROCEDURE — 88307 TISSUE EXAM BY PATHOLOGIST: CPT | Mod: 26

## 2024-06-10 PROCEDURE — 76942 ECHO GUIDE FOR BIOPSY: CPT

## 2024-06-10 PROCEDURE — 76942 ECHO GUIDE FOR BIOPSY: CPT | Mod: 26

## 2024-06-10 RX ORDER — ONDANSETRON 8 MG/1
4 TABLET, FILM COATED ORAL ONCE
Refills: 0 | Status: ACTIVE | OUTPATIENT
Start: 2024-06-10

## 2024-06-10 RX ORDER — SODIUM CHLORIDE 9 MG/ML
3 INJECTION INTRAMUSCULAR; INTRAVENOUS; SUBCUTANEOUS ONCE
Refills: 0 | Status: ACTIVE | OUTPATIENT
Start: 2024-06-10

## 2024-06-10 RX ORDER — ASPIRIN/CALCIUM CARB/MAGNESIUM 324 MG
1 TABLET ORAL
Refills: 0 | DISCHARGE

## 2024-06-10 RX ORDER — ALBUTEROL 90 UG/1
2 AEROSOL, METERED ORAL
Refills: 0 | DISCHARGE

## 2024-06-10 RX ORDER — PANTOPRAZOLE SODIUM 20 MG/1
40 TABLET, DELAYED RELEASE ORAL ONCE
Refills: 0 | Status: ACTIVE | OUTPATIENT
Start: 2024-06-10

## 2024-06-10 RX ORDER — URSODIOL 250 MG/1
1 TABLET, FILM COATED ORAL
Refills: 0 | DISCHARGE

## 2024-06-10 RX ORDER — METOPROLOL TARTRATE 50 MG
1 TABLET ORAL
Refills: 0 | DISCHARGE

## 2024-06-10 NOTE — ASU DISCHARGE PLAN (ADULT/PEDIATRIC) - NS MD DC FALL RISK RISK
For information on Fall & Injury Prevention, visit: [email or web address] OR  [email or web address] OR  https://www.cdc.gov/steadi/patient.html

## 2024-06-10 NOTE — ASU DISCHARGE PLAN (ADULT/PEDIATRIC) - CARE PROVIDER_API CALL
Marisa Roldan NP in Family Health  [address] [place] [id number]  Phone: [phone]  Fax: [phone]  Follow Up Time:

## 2024-06-15 ENCOUNTER — NON-APPOINTMENT (OUTPATIENT)
Age: 74
End: 2024-06-15

## 2024-06-18 ENCOUNTER — TRANSCRIPTION ENCOUNTER (OUTPATIENT)
Age: 74
End: 2024-06-18

## 2024-06-24 ENCOUNTER — TRANSCRIPTION ENCOUNTER (OUTPATIENT)
Age: 74
End: 2024-06-24

## 2024-06-27 ENCOUNTER — TRANSCRIPTION ENCOUNTER (OUTPATIENT)
Age: 74
End: 2024-06-27

## 2024-06-27 DIAGNOSIS — K74.3 PRIMARY BILIARY CIRRHOSIS: ICD-10-CM

## 2024-07-18 ENCOUNTER — APPOINTMENT (OUTPATIENT)
Dept: HEPATOLOGY | Facility: CLINIC | Age: 74
End: 2024-07-18
Payer: MEDICARE

## 2024-07-18 VITALS
SYSTOLIC BLOOD PRESSURE: 171 MMHG | DIASTOLIC BLOOD PRESSURE: 78 MMHG | HEIGHT: 60 IN | OXYGEN SATURATION: 96 % | WEIGHT: 142 LBS | BODY MASS INDEX: 27.88 KG/M2 | HEART RATE: 72 BPM

## 2024-07-18 DIAGNOSIS — K74.3 PRIMARY BILIARY CIRRHOSIS: ICD-10-CM

## 2024-07-18 PROCEDURE — 99214 OFFICE O/P EST MOD 30 MIN: CPT

## 2024-08-02 ENCOUNTER — NON-APPOINTMENT (OUTPATIENT)
Age: 74
End: 2024-08-02

## 2024-09-11 ENCOUNTER — RESULT CHARGE (OUTPATIENT)
Age: 74
End: 2024-09-11

## 2024-09-12 ENCOUNTER — APPOINTMENT (OUTPATIENT)
Dept: PULMONOLOGY | Facility: CLINIC | Age: 74
End: 2024-09-12
Payer: MEDICARE

## 2024-09-12 VITALS
DIASTOLIC BLOOD PRESSURE: 70 MMHG | OXYGEN SATURATION: 98 % | RESPIRATION RATE: 16 BRPM | HEART RATE: 70 BPM | SYSTOLIC BLOOD PRESSURE: 152 MMHG

## 2024-09-12 VITALS — BODY MASS INDEX: 28.05 KG/M2 | HEIGHT: 59.5 IN | WEIGHT: 141 LBS

## 2024-09-12 VITALS — DIASTOLIC BLOOD PRESSURE: 68 MMHG | SYSTOLIC BLOOD PRESSURE: 162 MMHG

## 2024-09-12 DIAGNOSIS — J43.9 EMPHYSEMA, UNSPECIFIED: ICD-10-CM

## 2024-09-12 DIAGNOSIS — F17.219 NICOTINE DEPENDENCE, CIGARETTES, WITH UNSPECIFIED NICOTINE-INDUCED DISORDERS: ICD-10-CM

## 2024-09-12 PROCEDURE — 99213 OFFICE O/P EST LOW 20 MIN: CPT | Mod: 25

## 2024-09-12 PROCEDURE — 99406 BEHAV CHNG SMOKING 3-10 MIN: CPT

## 2024-09-12 PROCEDURE — 94010 BREATHING CAPACITY TEST: CPT

## 2024-09-12 RX ORDER — EVOLOCUMAB 140 MG/ML
140 INJECTION, SOLUTION SUBCUTANEOUS
Refills: 0 | Status: ACTIVE | COMMUNITY

## 2024-09-12 NOTE — CONSULT LETTER
[Dear  ___] : Dear  [unfilled], [Consult Letter:] : I had the pleasure of evaluating your patient, [unfilled]. [Please see my note below.] : Please see my note below. [Sincerely,] : Sincerely, [DrAdi  ___] : Dr. VINES [FreeTextEntry3] : [facility] DO [facility]\par  Pulmonary Critical Care\par  Director Pulmonary Division\par  Medical Director Respiratory Therapy\par  [facility]\par  \par

## 2024-09-12 NOTE — DISCUSSION/SUMMARY
[FreeTextEntry1] : Mild anatomic emphysema, Nicotine addiction, multiple lung nodules PET scan without any significant uptake Lung, CT 6/22-8/24 stable nodules Post covid , now at baseline Continue lung cancer screening 2/23 Lung exam is normal   sp02  Spirometry remains normal,  Smoking cessation stressed 6 months or sooner if needed,

## 2024-09-18 ENCOUNTER — RX RENEWAL (OUTPATIENT)
Age: 74
End: 2024-09-18

## 2024-10-09 DIAGNOSIS — K74.3 PRIMARY BILIARY CIRRHOSIS: ICD-10-CM

## 2024-10-17 ENCOUNTER — APPOINTMENT (OUTPATIENT)
Dept: HEPATOLOGY | Facility: CLINIC | Age: 74
End: 2024-10-17

## 2024-12-09 ENCOUNTER — TRANSCRIPTION ENCOUNTER (OUTPATIENT)
Age: 74
End: 2024-12-09

## 2024-12-17 ENCOUNTER — TRANSCRIPTION ENCOUNTER (OUTPATIENT)
Age: 74
End: 2024-12-17

## 2025-01-07 ENCOUNTER — NON-APPOINTMENT (OUTPATIENT)
Age: 75
End: 2025-01-07

## 2025-01-23 ENCOUNTER — APPOINTMENT (OUTPATIENT)
Dept: HEPATOLOGY | Facility: CLINIC | Age: 75
End: 2025-01-23
Payer: MEDICARE

## 2025-01-23 VITALS
DIASTOLIC BLOOD PRESSURE: 79 MMHG | WEIGHT: 141 LBS | HEIGHT: 59 IN | BODY MASS INDEX: 28.43 KG/M2 | HEART RATE: 65 BPM | SYSTOLIC BLOOD PRESSURE: 139 MMHG | OXYGEN SATURATION: 98 %

## 2025-01-23 DIAGNOSIS — K74.00 HEPATIC FIBROSIS, UNSPECIFIED: ICD-10-CM

## 2025-01-23 DIAGNOSIS — I10 ESSENTIAL (PRIMARY) HYPERTENSION: ICD-10-CM

## 2025-01-23 DIAGNOSIS — K74.3 PRIMARY BILIARY CIRRHOSIS: ICD-10-CM

## 2025-01-23 PROCEDURE — 99214 OFFICE O/P EST MOD 30 MIN: CPT

## 2025-01-23 RX ORDER — CARVEDILOL 25 MG/1
25 TABLET, FILM COATED ORAL
Qty: 60 | Refills: 5 | Status: ACTIVE | COMMUNITY
Start: 2025-01-23

## 2025-01-27 ENCOUNTER — TRANSCRIPTION ENCOUNTER (OUTPATIENT)
Age: 75
End: 2025-01-27

## 2025-04-12 ENCOUNTER — NON-APPOINTMENT (OUTPATIENT)
Age: 75
End: 2025-04-12

## 2025-04-14 ENCOUNTER — APPOINTMENT (OUTPATIENT)
Dept: OTOLARYNGOLOGY | Facility: CLINIC | Age: 75
End: 2025-04-14
Payer: MEDICARE

## 2025-04-14 VITALS
WEIGHT: 141 LBS | DIASTOLIC BLOOD PRESSURE: 72 MMHG | SYSTOLIC BLOOD PRESSURE: 158 MMHG | BODY MASS INDEX: 28.43 KG/M2 | HEART RATE: 84 BPM | HEIGHT: 59 IN

## 2025-04-14 DIAGNOSIS — H93.A9 PULSATILE TINNITUS, UNSPECIFIED EAR: ICD-10-CM

## 2025-04-14 DIAGNOSIS — H91.93 UNSPECIFIED HEARING LOSS, BILATERAL: ICD-10-CM

## 2025-04-14 PROCEDURE — 99202 OFFICE O/P NEW SF 15 MIN: CPT

## 2025-04-18 ENCOUNTER — APPOINTMENT (OUTPATIENT)
Dept: ORTHOPEDIC SURGERY | Facility: CLINIC | Age: 75
End: 2025-04-18

## 2025-04-18 DIAGNOSIS — M23.672 OTHER SPONTANEOUS DISRUPTION OF CAPSULAR LIGAMENT OF LEFT KNEE: ICD-10-CM

## 2025-04-18 DIAGNOSIS — M25.562 PAIN IN LEFT KNEE: ICD-10-CM

## 2025-04-18 PROCEDURE — 20610 DRAIN/INJ JOINT/BURSA W/O US: CPT | Mod: LT

## 2025-04-18 PROCEDURE — 99204 OFFICE O/P NEW MOD 45 MIN: CPT | Mod: 25

## 2025-04-18 PROCEDURE — 73564 X-RAY EXAM KNEE 4 OR MORE: CPT | Mod: LT

## 2025-04-18 PROCEDURE — 72170 X-RAY EXAM OF PELVIS: CPT

## 2025-04-18 RX ORDER — LIDOCAINE HCL/PF 10 MG/ML
1 VIAL (ML) INJECTION
Refills: 0 | Status: COMPLETED | OUTPATIENT
Start: 2025-04-18

## 2025-04-18 RX ORDER — METHYLPRED ACET/NACL,ISO-OS/PF 40 MG/ML
40 VIAL (ML) INJECTION
Refills: 0 | Status: COMPLETED | OUTPATIENT
Start: 2025-04-18

## 2025-04-18 RX ADMIN — LIDOCAINE HYDROCHLORIDE 4 %: 10 INJECTION, SOLUTION INFILTRATION; PERINEURAL at 00:00

## 2025-04-18 RX ADMIN — METHYLPREDNISOLONE ACETATE 2 MG/ML: 40 INJECTION, SUSPENSION INTRA-ARTICULAR; INTRALESIONAL; INTRAMUSCULAR; SOFT TISSUE at 00:00

## 2025-04-21 ENCOUNTER — NON-APPOINTMENT (OUTPATIENT)
Age: 75
End: 2025-04-21

## 2025-04-22 ENCOUNTER — APPOINTMENT (OUTPATIENT)
Dept: ORTHOPEDIC SURGERY | Facility: CLINIC | Age: 75
End: 2025-04-22
Payer: MEDICARE

## 2025-04-22 PROCEDURE — 99213 OFFICE O/P EST LOW 20 MIN: CPT

## 2025-05-16 ENCOUNTER — APPOINTMENT (OUTPATIENT)
Dept: ORTHOPEDIC SURGERY | Facility: CLINIC | Age: 75
End: 2025-05-16

## 2025-06-10 ENCOUNTER — APPOINTMENT (OUTPATIENT)
Dept: ORTHOPEDIC SURGERY | Facility: CLINIC | Age: 75
End: 2025-06-10
Payer: MEDICARE

## 2025-06-10 PROBLEM — M76.31 ILIOTIBIAL BAND SYNDROME OF RIGHT SIDE: Status: ACTIVE | Noted: 2025-06-10

## 2025-06-10 PROBLEM — M47.816 DEGENERATIVE JOINT DISEASE (DJD) OF LUMBAR SPINE: Status: ACTIVE | Noted: 2025-06-10

## 2025-06-10 PROBLEM — S86.891A RIGHT MEDIAL TIBIAL STRESS SYNDROME, INITIAL ENCOUNTER: Status: ACTIVE | Noted: 2025-06-10

## 2025-06-10 PROCEDURE — 99214 OFFICE O/P EST MOD 30 MIN: CPT

## 2025-07-21 ENCOUNTER — OUTPATIENT (OUTPATIENT)
Dept: OUTPATIENT SERVICES | Facility: HOSPITAL | Age: 75
LOS: 1 days | End: 2025-07-21
Payer: MEDICARE

## 2025-07-21 ENCOUNTER — APPOINTMENT (OUTPATIENT)
Dept: ULTRASOUND IMAGING | Facility: CLINIC | Age: 75
End: 2025-07-21

## 2025-07-21 DIAGNOSIS — K74.3 PRIMARY BILIARY CIRRHOSIS: ICD-10-CM

## 2025-07-21 DIAGNOSIS — Z90.89 ACQUIRED ABSENCE OF OTHER ORGANS: Chronic | ICD-10-CM

## 2025-07-21 DIAGNOSIS — Z90.710 ACQUIRED ABSENCE OF BOTH CERVIX AND UTERUS: Chronic | ICD-10-CM

## 2025-07-21 DIAGNOSIS — Z90.49 ACQUIRED ABSENCE OF OTHER SPECIFIED PARTS OF DIGESTIVE TRACT: Chronic | ICD-10-CM

## 2025-07-21 DIAGNOSIS — Z98.49 CATARACT EXTRACTION STATUS, UNSPECIFIED EYE: Chronic | ICD-10-CM

## 2025-07-21 PROCEDURE — 76700 US EXAM ABDOM COMPLETE: CPT | Mod: 26

## 2025-07-31 ENCOUNTER — APPOINTMENT (OUTPATIENT)
Dept: HEPATOLOGY | Facility: CLINIC | Age: 75
End: 2025-07-31
Payer: MEDICARE

## 2025-07-31 VITALS
BODY MASS INDEX: 27.82 KG/M2 | DIASTOLIC BLOOD PRESSURE: 78 MMHG | HEART RATE: 71 BPM | OXYGEN SATURATION: 96 % | SYSTOLIC BLOOD PRESSURE: 151 MMHG | HEIGHT: 59 IN | WEIGHT: 138 LBS

## 2025-07-31 DIAGNOSIS — K74.00 HEPATIC FIBROSIS, UNSPECIFIED: ICD-10-CM

## 2025-07-31 DIAGNOSIS — K74.3 PRIMARY BILIARY CIRRHOSIS: ICD-10-CM

## 2025-07-31 PROCEDURE — 99214 OFFICE O/P EST MOD 30 MIN: CPT
